# Patient Record
Sex: MALE | Race: WHITE | NOT HISPANIC OR LATINO
[De-identification: names, ages, dates, MRNs, and addresses within clinical notes are randomized per-mention and may not be internally consistent; named-entity substitution may affect disease eponyms.]

---

## 2019-01-08 ENCOUNTER — NON-APPOINTMENT (OUTPATIENT)
Age: 69
End: 2019-01-08

## 2019-01-08 ENCOUNTER — APPOINTMENT (OUTPATIENT)
Dept: CARDIOLOGY | Facility: CLINIC | Age: 69
End: 2019-01-08
Payer: COMMERCIAL

## 2019-01-08 VITALS
HEIGHT: 69 IN | BODY MASS INDEX: 34.21 KG/M2 | HEART RATE: 77 BPM | DIASTOLIC BLOOD PRESSURE: 85 MMHG | SYSTOLIC BLOOD PRESSURE: 154 MMHG | OXYGEN SATURATION: 94 % | WEIGHT: 231 LBS

## 2019-01-08 DIAGNOSIS — R07.89 OTHER CHEST PAIN: ICD-10-CM

## 2019-01-08 DIAGNOSIS — G47.33 OBSTRUCTIVE SLEEP APNEA (ADULT) (PEDIATRIC): ICD-10-CM

## 2019-01-08 DIAGNOSIS — I25.84 ATHEROSCLEROTIC HEART DISEASE OF NATIVE CORONARY ARTERY W/OUT ANGINA PECTORIS: ICD-10-CM

## 2019-01-08 DIAGNOSIS — R06.89 OTHER ABNORMALITIES OF BREATHING: ICD-10-CM

## 2019-01-08 DIAGNOSIS — I25.10 ATHEROSCLEROTIC HEART DISEASE OF NATIVE CORONARY ARTERY W/OUT ANGINA PECTORIS: ICD-10-CM

## 2019-01-08 PROCEDURE — 99215 OFFICE O/P EST HI 40 MIN: CPT

## 2019-01-08 PROCEDURE — 93000 ELECTROCARDIOGRAM COMPLETE: CPT

## 2019-01-08 NOTE — PHYSICAL EXAM
[General Appearance - Well Developed] : well developed [Normal Appearance] : normal appearance [Well Groomed] : well groomed [General Appearance - Well Nourished] : well nourished [No Deformities] : no deformities [General Appearance - In No Acute Distress] : no acute distress [Normal Conjunctiva] : the conjunctiva exhibited no abnormalities [Normal Oral Mucosa] : normal oral mucosa [Normal Jugular Venous A Waves Present] : normal jugular venous A waves present [Normal Jugular Venous V Waves Present] : normal jugular venous V waves present [No Jugular Venous Ross A Waves] : no jugular venous ross A waves [Respiration, Rhythm And Depth] : normal respiratory rhythm and effort [Exaggerated Use Of Accessory Muscles For Inspiration] : no accessory muscle use [Auscultation Breath Sounds / Voice Sounds] : lungs were clear to auscultation bilaterally [Bowel Sounds] : normal bowel sounds [Abdomen Soft] : soft [Abdomen Tenderness] : non-tender [Abnormal Walk] : normal gait [Gait - Sufficient For Exercise Testing] : the gait was sufficient for exercise testing [Nail Clubbing] : no clubbing of the fingernails [Cyanosis, Localized] : no localized cyanosis [Skin Color & Pigmentation] : normal skin color and pigmentation [Skin Turgor] : normal skin turgor [] : no rash [Oriented To Time, Place, And Person] : oriented to person, place, and time [Impaired Insight] : insight and judgment were intact [No Anxiety] : not feeling anxious [Normal Rate] : normal [Normal S1] : normal S1 [Normal S2] : normal S2 [No Murmur] : no murmurs heard [2+] : left 2+ [No Abnormalities] : the abdominal aorta was not enlarged and no bruit was heard [No Pitting Edema] : no pitting edema present [S3] : no S3 [S4] : no S4 [Right Carotid Bruit] : no bruit heard over the right carotid [Left Carotid Bruit] : no bruit heard over the left carotid [Right Femoral Bruit] : no bruit heard over the right femoral artery [Left Femoral Bruit] : no bruit heard over the left femoral artery

## 2019-01-08 NOTE — DISCUSSION/SUMMARY
[FreeTextEntry1] : This is a 68-year-old white male who has coronary calcification which places him at least with mild plaque. He had a normal nuclear stress test in 2016 total heart workload of 13 Mets. He has borderline hypertension and does have elevation of his LDL cholesterol based on her blood tests from 2015.\par \par In view of his elevated calcium score, I would like to do a cardiac CT angiogram to quantitate his disease and order to decide if we need to treat him aggressively.\par \par Pending the results of the CT scan we may do a stress test. He'll go for general blood work today to include a fasting lipid profile, as well as general blood work.\par \par This was discussed with the patient and I answered his questions. Has had chest discomfort in the past which proved not to be cardiac.

## 2019-01-08 NOTE — REVIEW OF SYSTEMS
[Recent Weight Gain (___ Lbs)] : recent [unfilled] ~Ulb weight gain [Loss Of Hearing] : hearing loss [Negative] : Gastrointestinal [Fever] : no fever [Headache] : no headache [Chills] : no chills [Feeling Fatigued] : not feeling fatigued [Blurry Vision] : no blurred vision [Seeing Double (Diplopia)] : no diplopia [Skin: A Rash] : no rash: [Dizziness] : no dizziness [Depression] : no depression [Anxiety] : no anxiety [Excessive Thirst] : no polydipsia [Easy Bleeding] : no tendency for easy bleeding

## 2019-01-08 NOTE — HISTORY OF PRESENT ILLNESS
[FreeTextEntry1] : I saw Jason Glynn in the office today for a followup visit. He is a 68-year-old white male who enjoys good general health. Does have obstructive sleep apnea and uses a mask. He does exercise in the gym twice a week. He has been losing weight and has improved his diet.\par A resting 12-lead electrocardiogram performed today demonstrates regular sinus rhythm and appears within normal limits.\par \par I last saw him 11/15 was complaining of some chest pain when he first started exercising it would go away with additional exercise. He had a nuclear stress test performed 1/16 that showed no ischemia at workload of 13 Mets. EF was 61%\par \par 9/15 his blood work was remarkable for a cholesterol of 199, triglycerides 144, HDL 47, and . CRP was 4.4. Glucose 94. . Carotid Doppler 7/13 showed no plaque. He  had a cardiac calcium score10/15, of 275. Left main 26, , circumflex 42, and RCA 91.\par \par He does note some mild shortness of breath and some slight heaviness in his chest when he does physical exertion. This is not be getting any worse. He has been trying to watch his diet carefully.\par \par A resting 12-lead electrocardiogram demonstrates sinus rhythm. There is nonspecific T-wave flattening.

## 2019-01-09 LAB
ALBUMIN SERPL ELPH-MCNC: 4.3 G/DL
ALP BLD-CCNC: 69 U/L
ALT SERPL-CCNC: 26 U/L
ANION GAP SERPL CALC-SCNC: 12 MMOL/L
AST SERPL-CCNC: 12 U/L
BASOPHILS # BLD AUTO: 0.03 K/UL
BASOPHILS NFR BLD AUTO: 0.5 %
BILIRUB SERPL-MCNC: 1 MG/DL
BUN SERPL-MCNC: 21 MG/DL
CALCIUM SERPL-MCNC: 9.1 MG/DL
CHLORIDE SERPL-SCNC: 106 MMOL/L
CHOLEST SERPL-MCNC: 171 MG/DL
CHOLEST/HDLC SERPL: 4 RATIO
CO2 SERPL-SCNC: 26 MMOL/L
CREAT SERPL-MCNC: 1.01 MG/DL
CRP SERPL-MCNC: 0.51 MG/DL
EOSINOPHIL # BLD AUTO: 0.09 K/UL
EOSINOPHIL NFR BLD AUTO: 1.5 %
GLUCOSE SERPL-MCNC: 104 MG/DL
HCT VFR BLD CALC: 47.4 %
HDLC SERPL-MCNC: 43 MG/DL
HGB BLD-MCNC: 15.1 G/DL
IMM GRANULOCYTES NFR BLD AUTO: 0.2 %
LDLC SERPL CALC-MCNC: 114 MG/DL
LYMPHOCYTES # BLD AUTO: 1.45 K/UL
LYMPHOCYTES NFR BLD AUTO: 23.7 %
MAN DIFF?: NORMAL
MCHC RBC-ENTMCNC: 30.6 PG
MCHC RBC-ENTMCNC: 31.9 GM/DL
MCV RBC AUTO: 96 FL
MONOCYTES # BLD AUTO: 0.49 K/UL
MONOCYTES NFR BLD AUTO: 8 %
NEUTROPHILS # BLD AUTO: 4.06 K/UL
NEUTROPHILS NFR BLD AUTO: 66.1 %
PLATELET # BLD AUTO: 222 K/UL
POTASSIUM SERPL-SCNC: 4.4 MMOL/L
PROT SERPL-MCNC: 6.7 G/DL
PSA SERPL-MCNC: 2.07 NG/ML
RBC # BLD: 4.94 M/UL
RBC # FLD: 13.2 %
SODIUM SERPL-SCNC: 144 MMOL/L
TRIGL SERPL-MCNC: 71 MG/DL
WBC # FLD AUTO: 6.13 K/UL

## 2019-01-24 ENCOUNTER — OUTPATIENT (OUTPATIENT)
Dept: OUTPATIENT SERVICES | Facility: HOSPITAL | Age: 69
LOS: 1 days | End: 2019-01-24
Payer: COMMERCIAL

## 2019-01-24 VITALS
TEMPERATURE: 98 F | WEIGHT: 220.02 LBS | SYSTOLIC BLOOD PRESSURE: 137 MMHG | HEART RATE: 58 BPM | HEIGHT: 69 IN | OXYGEN SATURATION: 96 % | DIASTOLIC BLOOD PRESSURE: 81 MMHG

## 2019-01-24 DIAGNOSIS — R07.89 OTHER CHEST PAIN: ICD-10-CM

## 2019-01-24 DIAGNOSIS — Z98.52 VASECTOMY STATUS: Chronic | ICD-10-CM

## 2019-01-24 DIAGNOSIS — Z98.890 OTHER SPECIFIED POSTPROCEDURAL STATES: Chronic | ICD-10-CM

## 2019-01-24 LAB
ANION GAP SERPL CALC-SCNC: 10 MMOL/L — SIGNIFICANT CHANGE UP (ref 5–17)
BUN SERPL-MCNC: 23 MG/DL — SIGNIFICANT CHANGE UP (ref 7–23)
CALCIUM SERPL-MCNC: 9.2 MG/DL — SIGNIFICANT CHANGE UP (ref 8.4–10.5)
CHLORIDE SERPL-SCNC: 108 MMOL/L — SIGNIFICANT CHANGE UP (ref 96–108)
CO2 SERPL-SCNC: 24 MMOL/L — SIGNIFICANT CHANGE UP (ref 22–31)
CREAT SERPL-MCNC: 1.04 MG/DL — SIGNIFICANT CHANGE UP (ref 0.5–1.3)
GLUCOSE SERPL-MCNC: 96 MG/DL — SIGNIFICANT CHANGE UP (ref 70–99)
HCT VFR BLD CALC: 46.7 % — SIGNIFICANT CHANGE UP (ref 39–50)
HGB BLD-MCNC: 15.4 G/DL — SIGNIFICANT CHANGE UP (ref 13–17)
MCHC RBC-ENTMCNC: 30 PG — SIGNIFICANT CHANGE UP (ref 27–34)
MCHC RBC-ENTMCNC: 33.1 GM/DL — SIGNIFICANT CHANGE UP (ref 32–36)
MCV RBC AUTO: 90.5 FL — SIGNIFICANT CHANGE UP (ref 80–100)
PLATELET # BLD AUTO: 240 K/UL — SIGNIFICANT CHANGE UP (ref 150–400)
POTASSIUM SERPL-MCNC: 4.3 MMOL/L — SIGNIFICANT CHANGE UP (ref 3.5–5.3)
POTASSIUM SERPL-SCNC: 4.3 MMOL/L — SIGNIFICANT CHANGE UP (ref 3.5–5.3)
RBC # BLD: 5.16 M/UL — SIGNIFICANT CHANGE UP (ref 4.2–5.8)
RBC # FLD: 11.7 % — SIGNIFICANT CHANGE UP (ref 10.3–14.5)
SODIUM SERPL-SCNC: 142 MMOL/L — SIGNIFICANT CHANGE UP (ref 135–145)
WBC # BLD: 7.2 K/UL — SIGNIFICANT CHANGE UP (ref 3.8–10.5)
WBC # FLD AUTO: 7.2 K/UL — SIGNIFICANT CHANGE UP (ref 3.8–10.5)

## 2019-01-24 PROCEDURE — 85027 COMPLETE CBC AUTOMATED: CPT

## 2019-01-24 PROCEDURE — 93458 L HRT ARTERY/VENTRICLE ANGIO: CPT

## 2019-01-24 PROCEDURE — 99152 MOD SED SAME PHYS/QHP 5/>YRS: CPT | Mod: GC

## 2019-01-24 PROCEDURE — 93010 ELECTROCARDIOGRAM REPORT: CPT | Mod: 59

## 2019-01-24 PROCEDURE — 99152 MOD SED SAME PHYS/QHP 5/>YRS: CPT

## 2019-01-24 PROCEDURE — C1887: CPT

## 2019-01-24 PROCEDURE — 99203 OFFICE O/P NEW LOW 30 MIN: CPT

## 2019-01-24 PROCEDURE — 80048 BASIC METABOLIC PNL TOTAL CA: CPT

## 2019-01-24 PROCEDURE — C1894: CPT

## 2019-01-24 PROCEDURE — 93005 ELECTROCARDIOGRAM TRACING: CPT

## 2019-01-24 PROCEDURE — C1769: CPT

## 2019-01-24 PROCEDURE — 93458 L HRT ARTERY/VENTRICLE ANGIO: CPT | Mod: 26,GC

## 2019-01-24 NOTE — H&P CARDIOLOGY - HISTORY OF PRESENT ILLNESS
70 y/o M with GABY on CPAP, hx of kidney stone s/p stent several years ago presents for evaluated for fatigue and shortness of breath with activity. Pt had normal stress 2016.     Dr. Cruz 70 y/o M with GABY on CPAP, hx of kidney stone s/p stent several years ago presents for evaluated for fatigue and shortness of breath with activity. Pt had normal stress 2016.     Dr. Cruz cardiologist

## 2019-05-21 ENCOUNTER — APPOINTMENT (OUTPATIENT)
Dept: UROLOGY | Facility: CLINIC | Age: 69
End: 2019-05-21
Payer: MEDICARE

## 2019-05-21 VITALS
DIASTOLIC BLOOD PRESSURE: 65 MMHG | BODY MASS INDEX: 32.14 KG/M2 | HEIGHT: 69 IN | HEART RATE: 60 BPM | SYSTOLIC BLOOD PRESSURE: 143 MMHG | WEIGHT: 217 LBS | TEMPERATURE: 98.2 F | RESPIRATION RATE: 17 BRPM

## 2019-05-21 DIAGNOSIS — Z87.442 PERSONAL HISTORY OF URINARY CALCULI: ICD-10-CM

## 2019-05-21 DIAGNOSIS — Z87.891 PERSONAL HISTORY OF NICOTINE DEPENDENCE: ICD-10-CM

## 2019-05-21 PROCEDURE — 99203 OFFICE O/P NEW LOW 30 MIN: CPT

## 2019-05-21 NOTE — ASSESSMENT
[FreeTextEntry1] : 70 y/o M w/ L renal mass\par - discussed natural history of small renal masses, management options discussed including surveillance, ablation, extirpation, and biopsy\par - patient elected to have the mass removed, R/B/A to laparoscopic partial nephrectomy discussed\par - CXR to complete staging\par - cardiac clearance\par - book for OR

## 2019-05-21 NOTE — PHYSICAL EXAM
[General Appearance - Well Developed] : well developed [General Appearance - Well Nourished] : well nourished [Edema] : no peripheral edema [] : no respiratory distress [Exaggerated Use Of Accessory Muscles For Inspiration] : no accessory muscle use [Normal Station and Gait] : the gait and station were normal for the patient's age [No Focal Deficits] : no focal deficits [FreeTextEntry1] : no suprapubic tenderness, no flank tenderness, no CVAT

## 2019-05-21 NOTE — HISTORY OF PRESENT ILLNESS
[FreeTextEntry1] : 70 y/o M referred for L left renal mass. pt has a urologist following him for PSA and NAYE. After c/o back pain, a CT scan performed at [facility] demonstrated a L renal mass 1.7cm anterolateral, >50% exophytic. Pt is asymptomatic from a urologic point of view, denies hematuria, dysuria, urinary frequency, urinary urgency, weak urinary stream, feelings of retention, flank pain, suprapubic pain.\par \par He is a [facility] 911 . Former smoker 1/2pk per year x 1 year. Denies FHx of kidney cancer, his father had prostate cancer which was not treated b/c he was Dx at 79y/o\par \par Denies F/C, N/V, CP, SOB, abd pain.

## 2019-05-21 NOTE — HISTORY OF PRESENT ILLNESS
[FreeTextEntry1] : 70 y/o M referred for L left renal mass. pt has a urologist following him for PSA and NAYE. After c/o back pain, a CT scan performed at [facility] demonstrated a L renal mass 1.7cm anterolateral, >50% exophytic. Pt is asymptomatic from a urologic point of view, denies hematuria, dysuria, urinary frequency, urinary urgency, weak urinary stream, feelings of retention, flank pain, suprapubic pain.\par \par He is a [facility] 911 . Former smoker 1/2pk per year x 1 year. Denies FHx of kidney cancer, his father had prostate cancer which was not treated b/c he was Dx at 79y/o\par \par Denies F/C, N/V, CP, SOB, abd pain.

## 2019-05-30 ENCOUNTER — OUTPATIENT (OUTPATIENT)
Dept: OUTPATIENT SERVICES | Facility: HOSPITAL | Age: 69
LOS: 1 days | End: 2019-05-30

## 2019-05-30 VITALS
HEIGHT: 68.25 IN | WEIGHT: 223.11 LBS | DIASTOLIC BLOOD PRESSURE: 78 MMHG | HEART RATE: 55 BPM | TEMPERATURE: 98 F | SYSTOLIC BLOOD PRESSURE: 140 MMHG | RESPIRATION RATE: 16 BRPM

## 2019-05-30 DIAGNOSIS — Z98.52 VASECTOMY STATUS: Chronic | ICD-10-CM

## 2019-05-30 DIAGNOSIS — N28.1 CYST OF KIDNEY, ACQUIRED: ICD-10-CM

## 2019-05-30 DIAGNOSIS — N28.89 OTHER SPECIFIED DISORDERS OF KIDNEY AND URETER: ICD-10-CM

## 2019-05-30 DIAGNOSIS — Z98.890 OTHER SPECIFIED POSTPROCEDURAL STATES: Chronic | ICD-10-CM

## 2019-05-30 LAB
ANION GAP SERPL CALC-SCNC: 15 MMO/L — HIGH (ref 7–14)
APPEARANCE UR: CLEAR — SIGNIFICANT CHANGE UP
BILIRUB UR-MCNC: NEGATIVE — SIGNIFICANT CHANGE UP
BLD GP AB SCN SERPL QL: NEGATIVE — SIGNIFICANT CHANGE UP
BLOOD UR QL VISUAL: NEGATIVE — SIGNIFICANT CHANGE UP
BUN SERPL-MCNC: 20 MG/DL — SIGNIFICANT CHANGE UP (ref 7–23)
CALCIUM SERPL-MCNC: 9.2 MG/DL — SIGNIFICANT CHANGE UP (ref 8.4–10.5)
CHLORIDE SERPL-SCNC: 104 MMOL/L — SIGNIFICANT CHANGE UP (ref 98–107)
CO2 SERPL-SCNC: 23 MMOL/L — SIGNIFICANT CHANGE UP (ref 22–31)
COLOR SPEC: YELLOW — SIGNIFICANT CHANGE UP
CREAT SERPL-MCNC: 0.95 MG/DL — SIGNIFICANT CHANGE UP (ref 0.5–1.3)
GLUCOSE SERPL-MCNC: 97 MG/DL — SIGNIFICANT CHANGE UP (ref 70–99)
GLUCOSE UR-MCNC: 500 — HIGH
HCT VFR BLD CALC: 46.7 % — SIGNIFICANT CHANGE UP (ref 39–50)
HGB BLD-MCNC: 15 G/DL — SIGNIFICANT CHANGE UP (ref 13–17)
KETONES UR-MCNC: NEGATIVE — SIGNIFICANT CHANGE UP
LEUKOCYTE ESTERASE UR-ACNC: NEGATIVE — SIGNIFICANT CHANGE UP
MCHC RBC-ENTMCNC: 30.3 PG — SIGNIFICANT CHANGE UP (ref 27–34)
MCHC RBC-ENTMCNC: 32.1 % — SIGNIFICANT CHANGE UP (ref 32–36)
MCV RBC AUTO: 94.3 FL — SIGNIFICANT CHANGE UP (ref 80–100)
NITRITE UR-MCNC: NEGATIVE — SIGNIFICANT CHANGE UP
NRBC # FLD: 0 K/UL — SIGNIFICANT CHANGE UP (ref 0–0)
PH UR: 5.5 — SIGNIFICANT CHANGE UP (ref 5–8)
PLATELET # BLD AUTO: 206 K/UL — SIGNIFICANT CHANGE UP (ref 150–400)
PMV BLD: 9.8 FL — SIGNIFICANT CHANGE UP (ref 7–13)
POTASSIUM SERPL-MCNC: 3.8 MMOL/L — SIGNIFICANT CHANGE UP (ref 3.5–5.3)
POTASSIUM SERPL-SCNC: 3.8 MMOL/L — SIGNIFICANT CHANGE UP (ref 3.5–5.3)
PROT UR-MCNC: NEGATIVE — SIGNIFICANT CHANGE UP
RBC # BLD: 4.95 M/UL — SIGNIFICANT CHANGE UP (ref 4.2–5.8)
RBC # FLD: 12.8 % — SIGNIFICANT CHANGE UP (ref 10.3–14.5)
RH IG SCN BLD-IMP: NEGATIVE — SIGNIFICANT CHANGE UP
SODIUM SERPL-SCNC: 142 MMOL/L — SIGNIFICANT CHANGE UP (ref 135–145)
SP GR SPEC: 1.02 — SIGNIFICANT CHANGE UP (ref 1–1.04)
UROBILINOGEN FLD QL: NORMAL — SIGNIFICANT CHANGE UP
WBC # BLD: 6.21 K/UL — SIGNIFICANT CHANGE UP (ref 3.8–10.5)
WBC # FLD AUTO: 6.21 K/UL — SIGNIFICANT CHANGE UP (ref 3.8–10.5)

## 2019-05-30 RX ORDER — ASPIRIN/CALCIUM CARB/MAGNESIUM 324 MG
1 TABLET ORAL
Qty: 0 | Refills: 0 | DISCHARGE

## 2019-05-30 RX ORDER — SODIUM CHLORIDE 9 MG/ML
1000 INJECTION, SOLUTION INTRAVENOUS
Refills: 0 | Status: DISCONTINUED | OUTPATIENT
Start: 2019-06-10 | End: 2019-06-11

## 2019-05-30 RX ORDER — PANTOPRAZOLE SODIUM 20 MG/1
1 TABLET, DELAYED RELEASE ORAL
Qty: 0 | Refills: 0 | DISCHARGE

## 2019-05-30 NOTE — H&P PST ADULT - NSICDXPASTMEDICALHX_GEN_ALL_CORE_FT
PAST MEDICAL HISTORY:  Kidney stones     Obstructive sleep apnea, adult PAST MEDICAL HISTORY:  Cyst of left kidney     Kidney stones     Obstructive sleep apnea, adult

## 2019-05-30 NOTE — H&P PST ADULT - NSICDXPROBLEM_GEN_ALL_CORE_FT
PROBLEM DIAGNOSES  Problem: Kidney cysts  Assessment and Plan: Pt scheduled for surgery and given preop instructions including for Chlorhexidine and verbalized understanding.   OR Booking notified of Sleep Apnea and hearing loss   Request Angiogram report and CC for patient   Pt to take own GI protection

## 2019-05-31 LAB
BACTERIA UR CULT: SIGNIFICANT CHANGE UP
SPECIMEN SOURCE: SIGNIFICANT CHANGE UP

## 2019-06-05 ENCOUNTER — APPOINTMENT (OUTPATIENT)
Dept: INTERVENTIONAL RADIOLOGY/VASCULAR | Facility: CLINIC | Age: 69
End: 2019-06-05

## 2019-06-09 ENCOUNTER — TRANSCRIPTION ENCOUNTER (OUTPATIENT)
Age: 69
End: 2019-06-09

## 2019-06-10 ENCOUNTER — RESULT REVIEW (OUTPATIENT)
Age: 69
End: 2019-06-10

## 2019-06-10 ENCOUNTER — APPOINTMENT (OUTPATIENT)
Dept: UROLOGY | Facility: HOSPITAL | Age: 69
End: 2019-06-10

## 2019-06-10 ENCOUNTER — INPATIENT (INPATIENT)
Facility: HOSPITAL | Age: 69
LOS: 0 days | Discharge: HOME | End: 2019-06-11
Attending: UROLOGY | Admitting: UROLOGY
Payer: MEDICARE

## 2019-06-10 VITALS
OXYGEN SATURATION: 98 % | WEIGHT: 214.95 LBS | TEMPERATURE: 98 F | HEART RATE: 52 BPM | RESPIRATION RATE: 16 BRPM | SYSTOLIC BLOOD PRESSURE: 152 MMHG | DIASTOLIC BLOOD PRESSURE: 79 MMHG | HEIGHT: 69 IN

## 2019-06-10 DIAGNOSIS — N28.89 OTHER SPECIFIED DISORDERS OF KIDNEY AND URETER: ICD-10-CM

## 2019-06-10 DIAGNOSIS — Z98.890 OTHER SPECIFIED POSTPROCEDURAL STATES: Chronic | ICD-10-CM

## 2019-06-10 DIAGNOSIS — S02.5XXA FRACTURE OF TOOTH (TRAUMATIC), INITIAL ENCOUNTER FOR CLOSED FRACTURE: ICD-10-CM

## 2019-06-10 DIAGNOSIS — Z98.52 VASECTOMY STATUS: Chronic | ICD-10-CM

## 2019-06-10 DIAGNOSIS — K21.9 GASTRO-ESOPHAGEAL REFLUX DISEASE WITHOUT ESOPHAGITIS: ICD-10-CM

## 2019-06-10 DIAGNOSIS — G47.33 OBSTRUCTIVE SLEEP APNEA (ADULT) (PEDIATRIC): ICD-10-CM

## 2019-06-10 LAB — RH IG SCN BLD-IMP: NEGATIVE — SIGNIFICANT CHANGE UP

## 2019-06-10 PROCEDURE — 88305 TISSUE EXAM BY PATHOLOGIST: CPT | Mod: 26

## 2019-06-10 PROCEDURE — 88312 SPECIAL STAINS GROUP 1: CPT | Mod: 26

## 2019-06-10 PROCEDURE — 76998 US GUIDE INTRAOP: CPT | Mod: 26

## 2019-06-10 PROCEDURE — 88331 PATH CONSLTJ SURG 1 BLK 1SPC: CPT | Mod: 26

## 2019-06-10 PROCEDURE — 99223 1ST HOSP IP/OBS HIGH 75: CPT

## 2019-06-10 PROCEDURE — 71045 X-RAY EXAM CHEST 1 VIEW: CPT | Mod: 26

## 2019-06-10 PROCEDURE — 88307 TISSUE EXAM BY PATHOLOGIST: CPT | Mod: 26

## 2019-06-10 PROCEDURE — 50543 LAPARO PARTIAL NEPHRECTOMY: CPT | Mod: LT

## 2019-06-10 RX ORDER — ONDANSETRON 8 MG/1
4 TABLET, FILM COATED ORAL ONCE
Refills: 0 | Status: COMPLETED | OUTPATIENT
Start: 2019-06-10 | End: 2019-06-10

## 2019-06-10 RX ORDER — HYDROMORPHONE HYDROCHLORIDE 2 MG/ML
0.5 INJECTION INTRAMUSCULAR; INTRAVENOUS; SUBCUTANEOUS
Refills: 0 | Status: DISCONTINUED | OUTPATIENT
Start: 2019-06-10 | End: 2019-06-11

## 2019-06-10 RX ORDER — ONDANSETRON 8 MG/1
4 TABLET, FILM COATED ORAL EVERY 6 HOURS
Refills: 0 | Status: DISCONTINUED | OUTPATIENT
Start: 2019-06-10 | End: 2019-06-11

## 2019-06-10 RX ORDER — KETOROLAC TROMETHAMINE 30 MG/ML
15 SYRINGE (ML) INJECTION EVERY 6 HOURS
Refills: 0 | Status: DISCONTINUED | OUTPATIENT
Start: 2019-06-10 | End: 2019-06-11

## 2019-06-10 RX ORDER — HEPARIN SODIUM 5000 [USP'U]/ML
5000 INJECTION INTRAVENOUS; SUBCUTANEOUS EVERY 8 HOURS
Refills: 0 | Status: DISCONTINUED | OUTPATIENT
Start: 2019-06-10 | End: 2019-06-11

## 2019-06-10 RX ORDER — ACETAMINOPHEN 500 MG
1000 TABLET ORAL ONCE
Refills: 0 | Status: COMPLETED | OUTPATIENT
Start: 2019-06-10 | End: 2019-06-10

## 2019-06-10 RX ORDER — METOCLOPRAMIDE HCL 10 MG
10 TABLET ORAL EVERY 6 HOURS
Refills: 0 | Status: DISCONTINUED | OUTPATIENT
Start: 2019-06-10 | End: 2019-06-11

## 2019-06-10 RX ORDER — PANTOPRAZOLE SODIUM 20 MG/1
40 TABLET, DELAYED RELEASE ORAL
Refills: 0 | Status: DISCONTINUED | OUTPATIENT
Start: 2019-06-10 | End: 2019-06-11

## 2019-06-10 RX ORDER — ACETAMINOPHEN 500 MG
1000 TABLET ORAL ONCE
Refills: 0 | Status: COMPLETED | OUTPATIENT
Start: 2019-06-11 | End: 2019-06-11

## 2019-06-10 RX ORDER — SENNA PLUS 8.6 MG/1
2 TABLET ORAL AT BEDTIME
Refills: 0 | Status: DISCONTINUED | OUTPATIENT
Start: 2019-06-10 | End: 2019-06-11

## 2019-06-10 RX ORDER — DOCUSATE SODIUM 100 MG
100 CAPSULE ORAL THREE TIMES A DAY
Refills: 0 | Status: DISCONTINUED | OUTPATIENT
Start: 2019-06-10 | End: 2019-06-11

## 2019-06-10 RX ADMIN — Medication 10 MILLIGRAM(S): at 17:40

## 2019-06-10 RX ADMIN — ONDANSETRON 4 MILLIGRAM(S): 8 TABLET, FILM COATED ORAL at 16:34

## 2019-06-10 RX ADMIN — Medication 100 MILLIGRAM(S): at 22:18

## 2019-06-10 RX ADMIN — HEPARIN SODIUM 5000 UNIT(S): 5000 INJECTION INTRAVENOUS; SUBCUTANEOUS at 22:18

## 2019-06-10 RX ADMIN — Medication 1000 MILLIGRAM(S): at 10:30

## 2019-06-10 RX ADMIN — HEPARIN SODIUM 5000 UNIT(S): 5000 INJECTION INTRAVENOUS; SUBCUTANEOUS at 16:23

## 2019-06-10 RX ADMIN — ONDANSETRON 4 MILLIGRAM(S): 8 TABLET, FILM COATED ORAL at 10:15

## 2019-06-10 RX ADMIN — Medication 400 MILLIGRAM(S): at 10:15

## 2019-06-10 RX ADMIN — SODIUM CHLORIDE 125 MILLILITER(S): 9 INJECTION, SOLUTION INTRAVENOUS at 10:15

## 2019-06-10 RX ADMIN — Medication 400 MILLIGRAM(S): at 22:18

## 2019-06-10 RX ADMIN — Medication 15 MILLIGRAM(S): at 22:18

## 2019-06-10 RX ADMIN — Medication 400 MILLIGRAM(S): at 16:23

## 2019-06-10 NOTE — CONSULT NOTE ADULT - ASSESSMENT
69M GABY, [place], kidney stones, GERD, Mathews's esophagus, cath January 2019 mild diffuse CAD/nonobstructive CAD, back pain, pt had noted left kidney cyst (on CT scan with w/u for lower back pain) concerning for malignancy.  Now s/p L lap partial nephrectomy.

## 2019-06-10 NOTE — CONSULT NOTE ADULT - PROBLEM/RECOMMENDATION-1
DISPLAY PLAN FREE TEXT
Principal Discharge DX:	Acute on chronic systolic congestive heart failure  Goal:	symptom management  Assessment and plan of treatment:	Weigh yourself daily.  If you gain 3lbs in 3 days, or 5lbs in a week call your Health Care Provider.  Do not eat or drink foods containing more than 2000mg of salt (sodium) in your diet every day.  Call your Health Care Provider if you have any swelling or increased swelling in your feet, ankles, and/or stomach.  Take all of your medication as directed.  If you become dizzy call your Health Care Provider.  Secondary Diagnosis:	Emphysema/COPD  Goal:	symptom management  Assessment and plan of treatment:	Call your Health Care provider upon arrival home to make a follow up appointment within one week.  Take all inhalers as prescribed by your Health Care Provider.  Take steroids as prescribed by your Health Care Provider.  If your cough increases infrequency and severity and/or you have shortness of breath or increased shortness of breath call your Health Care Provider.  If you develop fever, chills, night sweats, malaise, and/or change in mental status call your Health care Provider.  Nutrition is very important.  Eat small frequent meals.  Increase your activity as tolerated.  Do not stay in bed all day  Secondary Diagnosis:	Anginal pain  Goal:	resolved  Assessment and plan of treatment:	take medications as prescribed - follow up with Dr Lynch on Monday, July 30  Secondary Diagnosis:	CAD (coronary artery disease)  Goal:	risk modification  Assessment and plan of treatment:	Coronary artery disease is a condition where the arteries the supply the heart muscle get clogges with fatty deposits & puts you at risk for a heart attack  Call your doctor if you have any new pain, pressure, or discomfort in the center of your chest, pain, tingling or discomfort in arms, back, neck, jaw, or stomach, shortness of breath, nausea, vomiting, burping or heartburn, sweating, cold and clammy skin, racing or abnormal heartbeat for more than 10 minutes or if they keep coming & going.  Call 911 and do not tr to get to hospital by care  You can help yourself with lefestyle changes (quitting smoking if you smoke), eat lots of fruits & vegetables & low fat dairy products, not a lot of meat & fatty foods, walk or some form of physical activity most days of the week, lose weight if you are overweight  Take your cardiac medication as prescribed to lower cholesterol, to lower blood pressure, aspirin to prevent blood clots, and diabetes control  Make sure to keep appointments with doctor for cardiac follow up care  Secondary Diagnosis:	SOB (shortness of breath)  Goal:	resolved  Assessment and plan of treatment:	take medications as prescribed - follow up with Dr Lynch on Monday, July 30

## 2019-06-10 NOTE — CONSULT NOTE ADULT - SUBJECTIVE AND OBJECTIVE BOX
Patient is a 69y old  Male who presents with a chief complaint of Left kidney cyst (30 May 2019 08:37)    HPI: 69M GABY, [place], kidney stones, GERD, Mathews's esophagus, cath January 2019 mild diffuse CAD/nonobstructive CAD, back pain, pt had noted left kidney cyst (on CT scan with w/u for lower back pain) concerning for malignancy.  Denied hematuria or dysuria.  Now s/p L lap partial nephrectomy.  +nausea/vomiting after anesthesia, better after Reglan.  No chest pain, SOB.    Allergies  No Known Allergies    HOME MEDICATIONS: [ X] Reviewed  Protonix    MEDICATIONS  (STANDING):  acetaminophen  IVPB .. 1000 milliGRAM(s) IV Intermittent once  docusate sodium 100 milliGRAM(s) Oral three times a day  heparin  Injectable 5000 Unit(s) SubCutaneous every 8 hours  ketorolac   Injectable 15 milliGRAM(s) IV Push every 6 hours  lactated ringers. 1000 milliLiter(s) (125 mL/Hr) IV Continuous <Continuous>  pantoprazole    Tablet 40 milliGRAM(s) Oral before breakfast    MEDICATIONS  (PRN):  HYDROmorphone  Injectable 0.5 milliGRAM(s) IV Push every 10 minutes PRN Severe Pain (7 - 10)  metoclopramide Injectable 10 milliGRAM(s) IV Push every 6 hours PRN nausea  ondansetron Injectable 4 milliGRAM(s) IV Push every 6 hours PRN Nausea and/or Vomiting  senna 2 Tablet(s) Oral at bedtime PRN Constipation    PAST MEDICAL & SURGICAL HISTORY:  Cyst of left kidney  Obstructive sleep apnea, adult  Kidney stones  S/P knee surgery: left  H/O vasectomy    SOCIAL HISTORY:  retired [employer], 2 children, no tobacco, rare alcohol    FAMILY HISTORY:  father - prostate cancer    REVIEW OF SYSTEMS:  CONSTITUTIONAL: No fever or fatigue; has lost weight since long term, been more active  EYES: No eye pain, visual disturbances, or discharge  ENMT:  No difficulty hearing, tinnitus, vertigo; No sinus or throat pain  NECK: No pain or stiffness  BREASTS: No pain, masses, or nipple discharge  RESPIRATORY: No cough, wheezing, chills or hemoptysis; No shortness of breath  CARDIOVASCULAR: No chest pain, palpitations, dizziness, or leg swelling  GASTROINTESTINAL: No abdominal or epigastric pain. No nausea, vomiting, or hematemesis; No diarrhea or constipation. No melena or hematochezia.  GENITOURINARY: No dysuria, frequency, hematuria, or incontinence  NEUROLOGICAL: No headaches, memory loss, loss of strength, numbness, or tremors  SKIN: No itching, burning, rashes, or lesions   LYMPH NODES: No enlarged glands  ENDOCRINE: No heat or cold intolerance; No hair loss  MUSCULOSKELETAL: chronic back pain  PSYCHIATRIC: No depression, anxiety, mood swings, or difficulty sleeping  HEME/LYMPH: No easy bruising, or bleeding gums  ALLERGY AND IMMUNOLOGIC: No hives or eczema  [ X ] All other ROS negative  [  ] Unable to obtain due to poor mental status    Vital Signs Last 24 Hrs  T(C): 36.4 (10 Natalio 2019 13:00), Max: 36.7 (10 Natalio 2019 06:00)  T(F): 97.5 (10 Natalio 2019 13:00), Max: 98.1 (10 Natalio 2019 06:00)  HR: 58 (10 Natalio 2019 15:02) (47 - 58)  BP: 140/63 (10 Natalio 2019 15:02) (129/77 - 155/73)  BP(mean): 79 (10 Natalio 2019 13:00) (74 - 94)  RR: 17 (10 Natalio 2019 15:02) (10 - 18)  SpO2: 100% (10 Natalio 2019 15:02) (93% - 100%)    PHYSICAL EXAM:  GENERAL: NAD, well-groomed, well-developed  HEAD:  Atraumatic, Normocephalic  EYES: EOMI, PERRLA, conjunctiva and sclera clear  ENMT: Moist mucous membranes;  chipped front tooth  NECK: Supple, No JVD  RESPIRATORY: Clear to auscultation bilaterally; No rales, rhonchi, wheezing, or rubs  CARDIOVASCULAR: Regular rate and rhythm; No murmurs, rubs, or gallops  GASTROINTESTINAL: Soft, Bowel sounds present, incisions intact  GENITOURINARY: Not examined  EXTREMITIES:  2+ Peripheral Pulses, No clubbing, cyanosis, or edema  NERVOUS SYSTEM:  Alert & Oriented X3; Moving all 4 extremities; No gross sensory deficits  HEME/LYMPH: No lymphadenopathy noted  SKIN: No rashes or lesions; Incisions C/D/I  : +ridley pink urine    LABS:    Basic Metabolic Panel (05.30.19 @ 09:20)    Sodium, Serum: 142 mmol/L    Potassium, Serum: 3.8 mmol/L    Chloride, Serum: 104 mmol/L    Carbon Dioxide, Serum: 23 mmol/L    Anion Gap, Serum: 15 mmo/L    Blood Urea Nitrogen, Serum: 20 mg/dL    Creatinine, Serum: 0.95 mg/dL    Glucose, Serum: 97 mg/dL    Calcium, Total Serum: 9.2 mg/dL    eGFR if Non : 82:     eGFR if : 95 mL/min      Complete Blood Count (05.30.19 @ 09:20)    WBC Count: 6.21 K/uL    RBC Count: 4.95 M/uL    Hemoglobin: 15.0 g/dL    Hematocrit: 46.7 %    Mean Cell Volume: 94.3 fL    Mean Cell Hemoglobin: 30.3 pg    Mean Cell Hemoglobin Conc: 32.1 %    Red Cell Distrib Width: 12.8 %    Platelet Count - Automated: 206 K/uL    MPV: 9.8 fl    Nucleated RBC #: 0 K/uL      RADIOLOGY & ADDITIONAL STUDIES:    EKG:   Personally Reviewed:  [ ] YES     Imaging:   Personally Reviewed:  [ ] YES               Consultant(s) notes reviewed:    Care Discussed with Consultant(s)/Other Providers: urology re overall care

## 2019-06-10 NOTE — PROGRESS NOTE ADULT - SUBJECTIVE AND OBJECTIVE BOX
Post op check    This 70 yo M is s/p L. lap partial neph  PMH: GABY    Pt is awake and alert  Has no c/o  Afeb 130/64 54 100%RA    Abd- soft; appropriately tender             wounds C&D  Wilburn clear 225

## 2019-06-10 NOTE — CONSULT NOTE ADULT - PROBLEM SELECTOR RECOMMENDATION 3
post op management per urology, early ambulation, await return of bowel function, pain control, IVFs, IS, DVT ppx (SC heparin)

## 2019-06-11 ENCOUNTER — TRANSCRIPTION ENCOUNTER (OUTPATIENT)
Age: 69
End: 2019-06-11

## 2019-06-11 VITALS
DIASTOLIC BLOOD PRESSURE: 66 MMHG | SYSTOLIC BLOOD PRESSURE: 138 MMHG | OXYGEN SATURATION: 99 % | RESPIRATION RATE: 17 BRPM | TEMPERATURE: 98 F | HEART RATE: 73 BPM

## 2019-06-11 LAB
ANION GAP SERPL CALC-SCNC: 11 MMO/L — SIGNIFICANT CHANGE UP (ref 7–14)
BUN SERPL-MCNC: 17 MG/DL — SIGNIFICANT CHANGE UP (ref 7–23)
CALCIUM SERPL-MCNC: 8.6 MG/DL — SIGNIFICANT CHANGE UP (ref 8.4–10.5)
CHLORIDE SERPL-SCNC: 102 MMOL/L — SIGNIFICANT CHANGE UP (ref 98–107)
CO2 SERPL-SCNC: 24 MMOL/L — SIGNIFICANT CHANGE UP (ref 22–31)
CREAT SERPL-MCNC: 1.15 MG/DL — SIGNIFICANT CHANGE UP (ref 0.5–1.3)
GLUCOSE SERPL-MCNC: 121 MG/DL — HIGH (ref 70–99)
HCT VFR BLD CALC: 39 % — SIGNIFICANT CHANGE UP (ref 39–50)
HGB BLD-MCNC: 13 G/DL — SIGNIFICANT CHANGE UP (ref 13–17)
MCHC RBC-ENTMCNC: 30.7 PG — SIGNIFICANT CHANGE UP (ref 27–34)
MCHC RBC-ENTMCNC: 33.3 % — SIGNIFICANT CHANGE UP (ref 32–36)
MCV RBC AUTO: 92.2 FL — SIGNIFICANT CHANGE UP (ref 80–100)
NRBC # FLD: 0 K/UL — SIGNIFICANT CHANGE UP (ref 0–0)
PLATELET # BLD AUTO: 223 K/UL — SIGNIFICANT CHANGE UP (ref 150–400)
PMV BLD: 9.7 FL — SIGNIFICANT CHANGE UP (ref 7–13)
POTASSIUM SERPL-MCNC: 4.2 MMOL/L — SIGNIFICANT CHANGE UP (ref 3.5–5.3)
POTASSIUM SERPL-SCNC: 4.2 MMOL/L — SIGNIFICANT CHANGE UP (ref 3.5–5.3)
RBC # BLD: 4.23 M/UL — SIGNIFICANT CHANGE UP (ref 4.2–5.8)
RBC # FLD: 12.5 % — SIGNIFICANT CHANGE UP (ref 10.3–14.5)
SODIUM SERPL-SCNC: 137 MMOL/L — SIGNIFICANT CHANGE UP (ref 135–145)
WBC # BLD: 13.7 K/UL — HIGH (ref 3.8–10.5)
WBC # FLD AUTO: 13.7 K/UL — HIGH (ref 3.8–10.5)

## 2019-06-11 PROCEDURE — 99232 SBSQ HOSP IP/OBS MODERATE 35: CPT

## 2019-06-11 RX ORDER — SODIUM CHLORIDE 9 MG/ML
1000 INJECTION, SOLUTION INTRAVENOUS
Refills: 0 | Status: DISCONTINUED | OUTPATIENT
Start: 2019-06-11 | End: 2019-06-11

## 2019-06-11 RX ADMIN — Medication 15 MILLIGRAM(S): at 03:36

## 2019-06-11 RX ADMIN — Medication 400 MILLIGRAM(S): at 03:36

## 2019-06-11 RX ADMIN — PANTOPRAZOLE SODIUM 40 MILLIGRAM(S): 20 TABLET, DELAYED RELEASE ORAL at 07:30

## 2019-06-11 RX ADMIN — HEPARIN SODIUM 5000 UNIT(S): 5000 INJECTION INTRAVENOUS; SUBCUTANEOUS at 05:38

## 2019-06-11 RX ADMIN — Medication 15 MILLIGRAM(S): at 10:09

## 2019-06-11 RX ADMIN — Medication 10 MILLIGRAM(S): at 07:30

## 2019-06-11 RX ADMIN — Medication 100 MILLIGRAM(S): at 05:38

## 2019-06-11 RX ADMIN — SODIUM CHLORIDE 125 MILLILITER(S): 9 INJECTION, SOLUTION INTRAVENOUS at 07:30

## 2019-06-11 NOTE — PROGRESS NOTE ADULT - SUBJECTIVE AND OBJECTIVE BOX
Patient is a 69y old  Male who presents with a chief complaint of surgery (10 Natalio 2019 18:36)      SUBJECTIVE / OVERNIGHT EVENTS:  Feeling well, eager to go home.  Wilburn out, has voided.  +flatus, no nausea/vomiting.  Pain controlled.  Up walking, no dizziness.      MEDICATIONS  (STANDING):  dextrose 5% + sodium chloride 0.45%. 1000 milliLiter(s) (75 mL/Hr) IV Continuous <Continuous>  docusate sodium 100 milliGRAM(s) Oral three times a day  heparin  Injectable 5000 Unit(s) SubCutaneous every 8 hours  ketorolac   Injectable 15 milliGRAM(s) IV Push every 6 hours  pantoprazole    Tablet 40 milliGRAM(s) Oral before breakfast    MEDICATIONS  (PRN):  HYDROmorphone  Injectable 0.5 milliGRAM(s) IV Push every 10 minutes PRN Severe Pain (7 - 10)  metoclopramide Injectable 10 milliGRAM(s) IV Push every 6 hours PRN nausea  ondansetron Injectable 4 milliGRAM(s) IV Push every 6 hours PRN Nausea and/or Vomiting  senna 2 Tablet(s) Oral at bedtime PRN Constipation      CAPILLARY BLOOD GLUCOSE          I&O's Summary    10 Natalio 2019 07:01  -  11 Jun 2019 07:00  --------------------------------------------------------  IN: 310 mL / OUT: 2675 mL / NET: -2365 mL    11 Jun 2019 07:01  -  11 Jun 2019 11:14  --------------------------------------------------------  IN: 0 mL / OUT: 575 mL / NET: -575 mL        Vital Signs Last 24 Hrs  T(C): 36.7 (11 Jun 2019 09:21), Max: 36.9 (11 Jun 2019 01:38)  T(F): 98 (11 Jun 2019 09:21), Max: 98.4 (11 Jun 2019 01:38)  HR: 73 (11 Jun 2019 09:21) (53 - 86)  BP: 138/66 (11 Jun 2019 09:21) (122/57 - 149/76)  BP(mean): 79 (10 Natalio 2019 13:00) (74 - 89)  RR: 17 (11 Jun 2019 09:21) (10 - 18)  SpO2: 99% (11 Jun 2019 09:21) (93% - 100%)    PHYSICAL EXAM:  GENERAL: NAD, well-developed, walking in rivers  HEAD:  Atraumatic, Normocephalic  EYES: EOMI, PERRLA, conjunctiva and sclera clear  NECK: Supple, No JVD  CHEST/LUNG: Clear to auscultation bilaterally; No wheeze  HEART: Regular rate and rhythm; No murmurs, rubs, or gallops  ABDOMEN: Soft, incisions intact  EXTREMITIES:  2+ Peripheral Pulses, No clubbing, cyanosis, or edema  PSYCH: AAOx3  NEUROLOGY: non-focal  SKIN: No rashes or lesions    LABS:                        13.0   13.70 )-----------( 223      ( 11 Jun 2019 05:21 )             39.0     06-11    137  |  102  |  17  ----------------------------<  121<H>  4.2   |  24  |  1.15    Ca    8.6      11 Jun 2019 05:21    RADIOLOGY & ADDITIONAL TESTS:  < from: Xray Chest 1 View AP/PA (06.10.19 @ 19:07) >  Heart size and the mediastinum cannot be accurately evaluated on this   projection.  There is focal left retrocardiac opacity, possibly subsegmental   atelectasis. The remaining lungs are clear. No pleural effusion or   pneumothorax is seen.  There is pneumoperitoneum consistent with recent nephrectomy.  A 1.1 cm radiopaque density projects over the stomach bubble/left base,   of uncertain etiology. Question whether this is part of the   aforementioned left retrocardiac subsegmental atelectasis A portion of a   tooth is not excluded.    IMPRESSION:  Focal left retrocardiac opacity, possibly subsegmental   atelectasis.    1.1 cm radiopaque density projecting over the stomachbubble/left base,   of uncertain etiology. This may represent part of the aforementioned   subsegmental atelectasis. A portion of a tooth is not excluded.   Clinically correlate as to the size of the part of the tooth which was   broken.    Imaging Personally Reviewed:    Consultant(s) Notes Reviewed:      Care Discussed with Consultants/Other Providers: urology re overall care

## 2019-06-11 NOTE — DISCHARGE NOTE NURSING/CASE MANAGEMENT/SOCIAL WORK - NSDCDPATPORTLINK_GEN_ALL_CORE
You can access the [facility] Patient Portal, offered by [facility], by registering with the following website: [email or web address]

## 2019-06-11 NOTE — DISCHARGE NOTE PROVIDER - NSDCACTIVITY_GEN_ALL_CORE
Stairs allowed/No heavy lifting/straining/Showering allowed/Walking - Indoors allowed/Walking - Outdoors allowed

## 2019-06-11 NOTE — PROGRESS NOTE ADULT - PROBLEM SELECTOR PLAN 3
post op management per urology, ambulating well, bowel function returning, pain control, IS, DVT ppx (SC heparin).  likely d/c home today

## 2019-06-11 NOTE — PROGRESS NOTE ADULT - SUBJECTIVE AND OBJECTIVE BOX
Subjective    Pt states he feels well, got OOB yesterday, pain controlled, no flatus, no N/V.    Objective    Vital signs  T(F): , Max: 98.4 (06-11-19 @ 01:38)  HR: 66 (06-11-19 @ 05:36)  BP: 142/66 (06-11-19 @ 05:36)  SpO2: 99% (06-11-19 @ 05:36)  Wt(kg): --    Output     OUT:    Indwelling Catheter - Urethral: 1125 mL  Total OUT: 1125 mL    Total NET: -1125 mL          Gen NAD  Abd S/mild distension/appropriately tender   Wilburn pink tinge    Labs    CBC/BMP pending    Imaging    CXR negative

## 2019-06-11 NOTE — DISCHARGE NOTE PROVIDER - HOSPITAL COURSE
Pt had L. lap partial neph yesterday; did well; ambulating; had BM; jade reg diet; voiding; home today

## 2019-06-11 NOTE — DISCHARGE NOTE NURSING/CASE MANAGEMENT/SOCIAL WORK - NSDCPNINST_GEN_ALL_CORE
Call MD for any c/o difficulty urinating,  bloody urine, abdominal discomfort not relieved after medicated for pain, fever and a return appointment.  Take over the counter stool softener to prevent constipation that can be a side effect from taking pain medication.

## 2019-06-11 NOTE — PROGRESS NOTE ADULT - ASSESSMENT
69M GABY, [place], kidney stones, GERD, Mathews's esophagus, cath January 2019 mild diffuse CAD/nonobstructive CAD, back pain, pt had noted left kidney cyst (on CT scan with w/u for lower back pain) concerning for malignancy.  Now s/p L lap partial nephrectomy.

## 2019-06-11 NOTE — DISCHARGE NOTE PROVIDER - NSDCCPCAREPLAN_GEN_ALL_CORE_FT
PRINCIPAL DISCHARGE DIAGNOSIS  Diagnosis: Kidney mass  Assessment and Plan of Treatment: Drink plenty of fluids.  No heavy lifting (greater than 10 pounds) or straining for 4 to 6 weeks.  You may shower, just pat white strips dry, they will fall off in a few weeks. You may drive in 5 days if not needing pain meds  Call the office if you have fever greater than 101, difficulty urinating, pain not relieved with pain medication, nausea/vomiting..        SECONDARY DISCHARGE DIAGNOSES  Diagnosis: Kidney mass  Assessment and Plan of Treatment: Kidney mass

## 2019-06-11 NOTE — DISCHARGE NOTE PROVIDER - CARE PROVIDER_API CALL
Angel Moody; ASHUTOSH)  Urology  [address] [place] [address], [place] [address]  Phone: [phone]  Fax: [phone]  Follow Up Time: 1 month

## 2019-06-12 ENCOUNTER — APPOINTMENT (OUTPATIENT)
Dept: INTERVENTIONAL RADIOLOGY/VASCULAR | Facility: CLINIC | Age: 69
End: 2019-06-12

## 2019-06-12 PROBLEM — N28.1 CYST OF KIDNEY, ACQUIRED: Chronic | Status: ACTIVE | Noted: 2019-05-30

## 2019-06-18 ENCOUNTER — EMERGENCY (EMERGENCY)
Facility: HOSPITAL | Age: 69
LOS: 1 days | Discharge: HOME | End: 2019-06-18
Attending: EMERGENCY MEDICINE | Admitting: EMERGENCY MEDICINE
Payer: MEDICARE

## 2019-06-18 VITALS
DIASTOLIC BLOOD PRESSURE: 73 MMHG | SYSTOLIC BLOOD PRESSURE: 136 MMHG | HEART RATE: 53 BPM | OXYGEN SATURATION: 96 % | TEMPERATURE: 98 F | RESPIRATION RATE: 16 BRPM

## 2019-06-18 VITALS
OXYGEN SATURATION: 96 % | RESPIRATION RATE: 16 BRPM | TEMPERATURE: 98 F | HEART RATE: 57 BPM | DIASTOLIC BLOOD PRESSURE: 65 MMHG | SYSTOLIC BLOOD PRESSURE: 150 MMHG | HEIGHT: 69 IN | WEIGHT: 214.95 LBS

## 2019-06-18 DIAGNOSIS — Z98.890 OTHER SPECIFIED POSTPROCEDURAL STATES: Chronic | ICD-10-CM

## 2019-06-18 DIAGNOSIS — Z98.52 VASECTOMY STATUS: Chronic | ICD-10-CM

## 2019-06-18 LAB
ALBUMIN SERPL ELPH-MCNC: 3 G/DL — LOW (ref 3.3–5)
ALP SERPL-CCNC: 72 U/L — SIGNIFICANT CHANGE UP (ref 40–120)
ALT FLD-CCNC: 46 U/L — SIGNIFICANT CHANGE UP (ref 12–78)
ANION GAP SERPL CALC-SCNC: 7 MMOL/L — SIGNIFICANT CHANGE UP (ref 5–17)
APPEARANCE UR: CLEAR — SIGNIFICANT CHANGE UP
AST SERPL-CCNC: 14 U/L — LOW (ref 15–37)
BACTERIA # UR AUTO: ABNORMAL
BASOPHILS # BLD AUTO: 0.04 K/UL — SIGNIFICANT CHANGE UP (ref 0–0.2)
BASOPHILS NFR BLD AUTO: 0.4 % — SIGNIFICANT CHANGE UP (ref 0–2)
BILIRUB SERPL-MCNC: 0.7 MG/DL — SIGNIFICANT CHANGE UP (ref 0.2–1.2)
BILIRUB UR-MCNC: NEGATIVE — SIGNIFICANT CHANGE UP
BUN SERPL-MCNC: 24 MG/DL — HIGH (ref 7–23)
CALCIUM SERPL-MCNC: 8.9 MG/DL — SIGNIFICANT CHANGE UP (ref 8.5–10.1)
CHLORIDE SERPL-SCNC: 108 MMOL/L — SIGNIFICANT CHANGE UP (ref 96–108)
CO2 SERPL-SCNC: 26 MMOL/L — SIGNIFICANT CHANGE UP (ref 22–31)
COLOR SPEC: YELLOW — SIGNIFICANT CHANGE UP
CREAT SERPL-MCNC: 1.3 MG/DL — SIGNIFICANT CHANGE UP (ref 0.5–1.3)
DIFF PNL FLD: ABNORMAL
EOSINOPHIL # BLD AUTO: 0.22 K/UL — SIGNIFICANT CHANGE UP (ref 0–0.5)
EOSINOPHIL NFR BLD AUTO: 2.1 % — SIGNIFICANT CHANGE UP (ref 0–6)
EPI CELLS # UR: NEGATIVE — SIGNIFICANT CHANGE UP
GLUCOSE SERPL-MCNC: 124 MG/DL — HIGH (ref 70–99)
GLUCOSE UR QL: NEGATIVE — SIGNIFICANT CHANGE UP
HCT VFR BLD CALC: 37.6 % — LOW (ref 39–50)
HGB BLD-MCNC: 12.6 G/DL — LOW (ref 13–17)
IMM GRANULOCYTES NFR BLD AUTO: 0.4 % — SIGNIFICANT CHANGE UP (ref 0–1.5)
KETONES UR-MCNC: NEGATIVE — SIGNIFICANT CHANGE UP
LEUKOCYTE ESTERASE UR-ACNC: NEGATIVE — SIGNIFICANT CHANGE UP
LIDOCAIN IGE QN: 240 U/L — SIGNIFICANT CHANGE UP (ref 73–393)
LYMPHOCYTES # BLD AUTO: 19.4 % — SIGNIFICANT CHANGE UP (ref 13–44)
LYMPHOCYTES # BLD AUTO: 2.01 K/UL — SIGNIFICANT CHANGE UP (ref 1–3.3)
MCHC RBC-ENTMCNC: 31 PG — SIGNIFICANT CHANGE UP (ref 27–34)
MCHC RBC-ENTMCNC: 33.5 GM/DL — SIGNIFICANT CHANGE UP (ref 32–36)
MCV RBC AUTO: 92.6 FL — SIGNIFICANT CHANGE UP (ref 80–100)
MONOCYTES # BLD AUTO: 0.75 K/UL — SIGNIFICANT CHANGE UP (ref 0–0.9)
MONOCYTES NFR BLD AUTO: 7.2 % — SIGNIFICANT CHANGE UP (ref 2–14)
NEUTROPHILS # BLD AUTO: 7.31 K/UL — SIGNIFICANT CHANGE UP (ref 1.8–7.4)
NEUTROPHILS NFR BLD AUTO: 70.5 % — SIGNIFICANT CHANGE UP (ref 43–77)
NITRITE UR-MCNC: NEGATIVE — SIGNIFICANT CHANGE UP
NRBC # BLD: 0 /100 WBCS — SIGNIFICANT CHANGE UP (ref 0–0)
PH UR: 5 — SIGNIFICANT CHANGE UP (ref 5–8)
PLATELET # BLD AUTO: 266 K/UL — SIGNIFICANT CHANGE UP (ref 150–400)
POTASSIUM SERPL-MCNC: 3.8 MMOL/L — SIGNIFICANT CHANGE UP (ref 3.5–5.3)
POTASSIUM SERPL-SCNC: 3.8 MMOL/L — SIGNIFICANT CHANGE UP (ref 3.5–5.3)
PROT SERPL-MCNC: 6.9 G/DL — SIGNIFICANT CHANGE UP (ref 6–8.3)
PROT UR-MCNC: NEGATIVE — SIGNIFICANT CHANGE UP
RBC # BLD: 4.06 M/UL — LOW (ref 4.2–5.8)
RBC # FLD: 12.2 % — SIGNIFICANT CHANGE UP (ref 10.3–14.5)
RBC CASTS # UR COMP ASSIST: ABNORMAL /HPF (ref 0–4)
SODIUM SERPL-SCNC: 141 MMOL/L — SIGNIFICANT CHANGE UP (ref 135–145)
SP GR SPEC: 1 — LOW (ref 1.01–1.02)
UROBILINOGEN FLD QL: NEGATIVE — SIGNIFICANT CHANGE UP
WBC # BLD: 10.37 K/UL — SIGNIFICANT CHANGE UP (ref 3.8–10.5)
WBC # FLD AUTO: 10.37 K/UL — SIGNIFICANT CHANGE UP (ref 3.8–10.5)
WBC UR QL: SIGNIFICANT CHANGE UP

## 2019-06-18 PROCEDURE — 99284 EMERGENCY DEPT VISIT MOD MDM: CPT | Mod: 25

## 2019-06-18 PROCEDURE — 96360 HYDRATION IV INFUSION INIT: CPT | Mod: XU

## 2019-06-18 PROCEDURE — 85027 COMPLETE CBC AUTOMATED: CPT

## 2019-06-18 PROCEDURE — 36415 COLL VENOUS BLD VENIPUNCTURE: CPT

## 2019-06-18 PROCEDURE — 81001 URINALYSIS AUTO W/SCOPE: CPT

## 2019-06-18 PROCEDURE — 80053 COMPREHEN METABOLIC PANEL: CPT

## 2019-06-18 PROCEDURE — 83690 ASSAY OF LIPASE: CPT

## 2019-06-18 PROCEDURE — 74177 CT ABD & PELVIS W/CONTRAST: CPT | Mod: 26

## 2019-06-18 PROCEDURE — 74177 CT ABD & PELVIS W/CONTRAST: CPT

## 2019-06-18 RX ORDER — SODIUM CHLORIDE 9 MG/ML
1000 INJECTION INTRAMUSCULAR; INTRAVENOUS; SUBCUTANEOUS ONCE
Refills: 0 | Status: COMPLETED | OUTPATIENT
Start: 2019-06-18 | End: 2019-06-18

## 2019-06-18 RX ORDER — CEPHALEXIN 500 MG
500 CAPSULE ORAL ONCE
Refills: 0 | Status: COMPLETED | OUTPATIENT
Start: 2019-06-18 | End: 2019-06-18

## 2019-06-18 RX ADMIN — SODIUM CHLORIDE 1000 MILLILITER(S): 9 INJECTION INTRAMUSCULAR; INTRAVENOUS; SUBCUTANEOUS at 03:25

## 2019-06-18 RX ADMIN — Medication 500 MILLIGRAM(S): at 03:40

## 2019-06-18 RX ADMIN — SODIUM CHLORIDE 1000 MILLILITER(S): 9 INJECTION INTRAMUSCULAR; INTRAVENOUS; SUBCUTANEOUS at 02:30

## 2019-06-18 NOTE — ED ADULT NURSE NOTE - NSIMPLEMENTINTERV_GEN_ALL_ED
Implemented All Universal Safety Interventions:  [place] to call system. Call bell, personal items and telephone within reach. Instruct patient to call for assistance. Room bathroom lighting operational. Non-slip footwear when patient is off stretcher. Physically safe environment: no spills, clutter or unnecessary equipment. Stretcher in lowest position, wheels locked, appropriate side rails in place.

## 2019-06-18 NOTE — ED ADULT NURSE NOTE - CHPI ED NUR SYMPTOMS NEG
no blood in stool/no burning urination/no chills/no diarrhea/no fever/no vomiting/no dysuria/no hematuria/no nausea

## 2019-06-18 NOTE — ED ADULT NURSE NOTE - OBJECTIVE STATEMENT
Pt to ED c/c low abdominal pain tonight. Pt is post op kidney surgery to remove lesion. Noted with incidental redness to surgical wound left lower abdomen, ecchymosis central lower abdomen. Pt is tender to right lower quadrant on palpation, mildly distended

## 2019-06-18 NOTE — ED PROVIDER NOTE - OBJECTIVE STATEMENT
70yo male who presents with abd apin alana. pt had nephrectomy a week ago and woke up tonite with sudden abd pain, no vomiting or diarrhea, no fever, chills

## 2019-07-01 ENCOUNTER — APPOINTMENT (OUTPATIENT)
Dept: UROLOGY | Facility: CLINIC | Age: 69
End: 2019-07-01
Payer: MEDICARE

## 2019-07-01 DIAGNOSIS — C65.9 MALIGNANT NEOPLASM OF UNSPECIFIED RENAL PELVIS: ICD-10-CM

## 2019-07-01 PROCEDURE — 99024 POSTOP FOLLOW-UP VISIT: CPT

## 2019-07-01 NOTE — HISTORY OF PRESENT ILLNESS
[FreeTextEntry1] : Renal mass noted on May 6th for generalized abdominal pain . Renal mass was incidentally found . 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  [None] : no symptoms

## 2019-07-01 NOTE — REVIEW OF SYSTEMS
[Feeling Tired] : feeling tired [see HPI] : see HPI [Skin Wound] : skin wound [Negative] : Heme/Lymph

## 2019-07-01 NOTE — PHYSICAL EXAM
[General Appearance - Well Developed] : well developed [General Appearance - Well Nourished] : well nourished [Abdomen Soft] : soft [FreeTextEntry1] : incision sites healing Left trocar site with 2 CM hematoma  [Skin Color & Pigmentation] : normal skin color and pigmentation [Heart Rate And Rhythm] : Heart rate and rhythm were normal [] : no respiratory distress [Oriented To Time, Place, And Person] : oriented to person, place, and time [Normal Station and Gait] : the gait and station were normal for the patient's age [No Focal Deficits] : no focal deficits [No Palpable Adenopathy] : no palpable adenopathy

## 2019-07-01 NOTE — ASSESSMENT
[FreeTextEntry1] : He is doing well . His GI function has returned . His appetite is back . He is working on his new house . Reminded him not to lift anything heavy . He is working in the sun . Recommend increase in his hydration . Sun screen for his incisions . He is complaining of a right groin "rope " tenderness . He has a right inguinal hernia . He will keep an eye on it . if pain increases he will follow up with a surgeon .\par Discussed his pathology report and copies sent to [facility] . \par He will contact [facility] for possible benefits due to RCC . \par Follow up in 6 months with an ultrasound

## 2019-08-06 ENCOUNTER — APPOINTMENT (OUTPATIENT)
Dept: UROLOGY | Facility: CLINIC | Age: 69
End: 2019-08-06

## 2019-12-27 ENCOUNTER — APPOINTMENT (OUTPATIENT)
Dept: UROLOGY | Facility: CLINIC | Age: 69
End: 2019-12-27
Payer: MEDICARE

## 2019-12-27 ENCOUNTER — OUTPATIENT (OUTPATIENT)
Dept: OUTPATIENT SERVICES | Facility: HOSPITAL | Age: 69
LOS: 1 days | End: 2019-12-27
Payer: MEDICARE

## 2019-12-27 VITALS
HEIGHT: 69 IN | BODY MASS INDEX: 31.84 KG/M2 | HEART RATE: 62 BPM | DIASTOLIC BLOOD PRESSURE: 75 MMHG | RESPIRATION RATE: 17 BRPM | WEIGHT: 215 LBS | SYSTOLIC BLOOD PRESSURE: 147 MMHG | TEMPERATURE: 98.4 F

## 2019-12-27 DIAGNOSIS — R35.0 FREQUENCY OF MICTURITION: ICD-10-CM

## 2019-12-27 DIAGNOSIS — Z98.890 OTHER SPECIFIED POSTPROCEDURAL STATES: Chronic | ICD-10-CM

## 2019-12-27 DIAGNOSIS — Z98.52 VASECTOMY STATUS: Chronic | ICD-10-CM

## 2019-12-27 PROCEDURE — 76775 US EXAM ABDO BACK WALL LIM: CPT | Mod: 26

## 2019-12-27 PROCEDURE — 99214 OFFICE O/P EST MOD 30 MIN: CPT | Mod: 25

## 2019-12-27 PROCEDURE — 76775 US EXAM ABDO BACK WALL LIM: CPT

## 2019-12-27 RX ORDER — PANTOPRAZOLE SODIUM 40 MG/1
40 GRANULE, DELAYED RELEASE ORAL
Refills: 0 | Status: ACTIVE | COMMUNITY

## 2019-12-27 NOTE — PHYSICAL EXAM
[General Appearance - Well Developed] : well developed [General Appearance - Well Nourished] : well nourished [Abdomen Soft] : soft [Skin Color & Pigmentation] : normal skin color and pigmentation [Skin Turgor] : supple [Heart Rate And Rhythm] : Heart rate and rhythm were normal [Oriented To Time, Place, And Person] : oriented to person, place, and time [] : no respiratory distress [No Focal Deficits] : no focal deficits [Normal Station and Gait] : the gait and station were normal for the patient's age [No Palpable Adenopathy] : no palpable adenopathy

## 2019-12-27 NOTE — ASSESSMENT
[FreeTextEntry1] : Left partial nephrectomy done in June 2019 for RCC pT1a FG 2 . He denies hematuria . He is doing well .\par Follow up in 1 year

## 2019-12-27 NOTE — HISTORY OF PRESENT ILLNESS
[None] : no symptoms [FreeTextEntry1] : Left partial nephrectomy for RCC pT1a FG 2 Renal ultrasound done today

## 2020-01-06 DIAGNOSIS — N28.89 OTHER SPECIFIED DISORDERS OF KIDNEY AND URETER: ICD-10-CM

## 2020-01-06 DIAGNOSIS — C64.9 MALIGNANT NEOPLASM OF UNSPECIFIED KIDNEY, EXCEPT RENAL PELVIS: ICD-10-CM

## 2020-04-20 ENCOUNTER — TRANSCRIPTION ENCOUNTER (OUTPATIENT)
Age: 70
End: 2020-04-20

## 2020-06-12 NOTE — REVIEW OF SYSTEMS
[Painful Gisela] : painful Gisela [Genital bacterial infection] : genital bacterial infection [Loss of interest] : loss of interest in sexual activity [Genital yeast infection] : genital yeast infection [Poor quality erections] : Poor quality erections [Sexually Transmitted Disease (STD)] : sexually transmitted disease [No erections] : no erections [History of kidney stones] : history of kidney stones [Wake up at night to urinate  How many times?  ___] : wakes up to urinate [unfilled] times during the night [Negative] : Heme/Lymph

## 2020-06-15 ENCOUNTER — APPOINTMENT (OUTPATIENT)
Dept: UROLOGY | Facility: CLINIC | Age: 70
End: 2020-06-15
Payer: MEDICARE

## 2020-06-15 DIAGNOSIS — Z80.42 FAMILY HISTORY OF MALIGNANT NEOPLASM OF PROSTATE: ICD-10-CM

## 2020-06-15 PROCEDURE — 99203 OFFICE O/P NEW LOW 30 MIN: CPT | Mod: 95

## 2020-06-15 RX ORDER — PNV NO.95/FERROUS FUM/FOLIC AC 28MG-0.8MG
1000 TABLET ORAL
Refills: 0 | Status: ACTIVE | COMMUNITY

## 2020-06-15 NOTE — HISTORY OF PRESENT ILLNESS
[Other Location: e.g. School (Enter Location, City,State)___] : at [unfilled], at the time of the visit. [Spouse] : spouse [Verbal consent obtained from patient] : the patient, [unfilled] [FreeTextEntry1] : Verbal consent given on Rosaura 15, 2020 by the patient.\par \par The patient-doctor relationship has been established in a face to face fashion via real time video/audio HIPAA compliant communication using telemedicine software. The patient's identity has been confirmed. The patient was previously emailed a copy of the telemedicine consent. They have had a chance to review and has now given verbal consent and has requested care to be assessed and treated through telemedicine and understands there maybe limitations in this process and they may need further follow up care in the office and or hospital settings.\par \par Dear Dr. Jason Mclaughlin ([place]), [facility] ([phone])\par \par Thank you so much for the referral to help care for your patient.\par \par Chief Complaint Elevated PSA\par Date of first visit: 06/15/2020\par Occupation:  [place]\par \par JASON MURRAY  is a 70 year old , ,  with a prior renal malignancy who presents for evaluation of prostate nodule and a PSA 2.03 on 04/08/2020. His IntelliScore was 47% risk of HG dz 04/20. His prior PSA was 2.07 in 01/2019. Patient has a FMHx of Prostate Cancer.  he also had COVID with out acute symptoms.\par \par MRI ([facility]) official read on 05/30/2020. 42 cc, PSAD 0.04, PIRADS 4 lesion measuring 11 x 5 mm in left anterior midgland PZ. No LAD. No EPE. No Bony Lesions. The images have been reviewed and clinical implications discussed with the patient.  The NAYE was + in right mid which corresponds with a second lesion i am adding for the biopsy 4 out of 5. \par \par Patient is also s/p Left partial nephrectomy in 06/19 for 1.8 cm lesion path- Clear Cell RCC that has been followed by Dr. Moody, last visit in 12/2019.\par \par Prostate cancer screening: the patient and I spoke at length about prostate cancer screening, its risks and its benefits. The patient has (Age, NAYE, Fam ) 3 risk factors for prostate cancer.  He understands that many men with prostate cancer will die with the disease rather than of it and we also discussed the results large multi-center American and  prostate cancer screening trials. He also understands that PSA in and of itself does not diagnose prostate cancer but only assesses risk to a certain degree. The patient understands that to definitively screen for prostate cancer, a biopsy is required and this procedure has risks, including bleeding, infection, ED and urinary retention. The patient opted to biopsy.\par \par Urinary symptoms include nocturia x 1, \par \par 06/15/2020 \par IPSS 2 QOL 1 \par NIKKI 24 \par \par The patient denies fevers, chills, nausea and or vomiting and no unexplained weight loss.\par \par All pertinent laboratory, films and physician notes were reviewed.  Questionnaire results were discussed with patient.\par \par I spent 31 minutes of non-face to face time reviewing the patient's records, chart, uploading processing MR images, transferring MR images from PACS to an independent workstation, reviewing images on independent workstation, speaking with their physician and planning their prostate biopsy and preparation of an upcoming visit for 06/15/2020 .  The imaging and planning was highly complex and took this entire time. There is a second lesion i am adding given + NAYE and findings.

## 2020-06-15 NOTE — ASSESSMENT
[FreeTextEntry1] : 70 year old  male with a history of renal cell carcinoma treated by Dr. Moody (recurrence free) - the patient was found to have a positive NAYE that trigger an evaluation - exomedx high risk (47.9% GG2 or >) prostate cancer.  two lesions to biopsy plus std 12 core\par \par 1. labs at [facility]\par 2. medical clearance\par 3. Fusoin biopsy TP at [facility]\par \par Thank you very much for allowing me to assist in the care of this patient. Should you have any additional questions or concerns please do not hesitate to contact me.\par \par \par Sincerely,\par \par \par Elio Emanuel D.O.\par  of Urology and Radiology\par  of Urology at [facility]\par System Director for Prostate Cancer\par [address] [place] [address], [address]\par Phone: [phone]

## 2020-06-15 NOTE — PHYSICAL EXAM
[General Appearance - Well Developed] : well developed [General Appearance - Well Nourished] : well nourished [] : no respiratory distress [Abdomen Soft] : soft [Penis Abnormality] : normal circumcised penis [No Focal Deficits] : no focal deficits [Oriented To Time, Place, And Person] : oriented to person, place, and time [Not Anxious] : not anxious [FreeTextEntry1] : sense of smell returning after covid

## 2020-06-17 LAB
ANION GAP SERPL CALC-SCNC: 13 MMOL/L
APPEARANCE: CLEAR
BACTERIA: NEGATIVE
BASOPHILS # BLD AUTO: 0.05 K/UL
BASOPHILS NFR BLD AUTO: 0.8 %
BILIRUBIN URINE: NEGATIVE
BLOOD URINE: NEGATIVE
BUN SERPL-MCNC: 28 MG/DL
CALCIUM SERPL-MCNC: 9.7 MG/DL
CHLORIDE SERPL-SCNC: 103 MMOL/L
CO2 SERPL-SCNC: 26 MMOL/L
COLOR: YELLOW
CREAT SERPL-MCNC: 1.27 MG/DL
EOSINOPHIL # BLD AUTO: 0.08 K/UL
EOSINOPHIL NFR BLD AUTO: 1.2 %
GLUCOSE QUALITATIVE U: NEGATIVE
GLUCOSE SERPL-MCNC: 74 MG/DL
HCT VFR BLD CALC: 48.9 %
HGB BLD-MCNC: 15.5 G/DL
HYALINE CASTS: 0 /LPF
IMM GRANULOCYTES NFR BLD AUTO: 1.1 %
KETONES URINE: NEGATIVE
LEUKOCYTE ESTERASE URINE: NEGATIVE
LYMPHOCYTES # BLD AUTO: 1.69 K/UL
LYMPHOCYTES NFR BLD AUTO: 25.8 %
MAN DIFF?: NORMAL
MCHC RBC-ENTMCNC: 30.5 PG
MCHC RBC-ENTMCNC: 31.7 GM/DL
MCV RBC AUTO: 96.1 FL
MICROSCOPIC-UA: NORMAL
MONOCYTES # BLD AUTO: 0.56 K/UL
MONOCYTES NFR BLD AUTO: 8.6 %
NEUTROPHILS # BLD AUTO: 4.09 K/UL
NEUTROPHILS NFR BLD AUTO: 62.5 %
NITRITE URINE: NEGATIVE
PH URINE: 6
PLATELET # BLD AUTO: 227 K/UL
POTASSIUM SERPL-SCNC: 4.5 MMOL/L
PROTEIN URINE: NEGATIVE
PSA FREE FLD-MCNC: 18 %
PSA FREE SERPL-MCNC: 0.56 NG/ML
PSA SERPL-MCNC: 3.16 NG/ML
RBC # BLD: 5.09 M/UL
RBC # FLD: 12.3 %
RED BLOOD CELLS URINE: 1 /HPF
SODIUM SERPL-SCNC: 142 MMOL/L
SPECIFIC GRAVITY URINE: 1.02
SQUAMOUS EPITHELIAL CELLS: 0 /HPF
UROBILINOGEN URINE: NORMAL
WBC # FLD AUTO: 6.54 K/UL
WHITE BLOOD CELLS URINE: 0 /HPF

## 2020-06-18 LAB
24R-OH-CALCIDIOL SERPL-MCNC: 49.1 PG/ML
BACTERIA UR CULT: NORMAL

## 2020-06-24 ENCOUNTER — TRANSCRIPTION ENCOUNTER (OUTPATIENT)
Age: 70
End: 2020-06-24

## 2020-06-25 ENCOUNTER — OUTPATIENT (OUTPATIENT)
Dept: OUTPATIENT SERVICES | Facility: HOSPITAL | Age: 70
LOS: 1 days | Discharge: HOME | End: 2020-06-25
Payer: MEDICARE

## 2020-06-25 ENCOUNTER — RESULT REVIEW (OUTPATIENT)
Age: 70
End: 2020-06-25

## 2020-06-25 ENCOUNTER — APPOINTMENT (OUTPATIENT)
Dept: UROLOGY | Facility: HOSPITAL | Age: 70
End: 2020-06-25

## 2020-06-25 DIAGNOSIS — Z98.52 VASECTOMY STATUS: Chronic | ICD-10-CM

## 2020-06-25 DIAGNOSIS — Z98.890 OTHER SPECIFIED POSTPROCEDURAL STATES: Chronic | ICD-10-CM

## 2020-06-25 PROCEDURE — 88344 IMHCHEM/IMCYTCHM EA MLT ANTB: CPT | Mod: 26

## 2020-06-25 PROCEDURE — 64450 NJX AA&/STRD OTHER PN/BRANCH: CPT | Mod: 59

## 2020-06-25 PROCEDURE — 76872 US TRANSRECTAL: CPT | Mod: 26

## 2020-06-25 PROCEDURE — 55706 BX PRST8 NDL SAT SAMPLING: CPT

## 2020-06-25 PROCEDURE — 76942 ECHO GUIDE FOR BIOPSY: CPT | Mod: 26,59

## 2020-06-25 PROCEDURE — 88305 TISSUE EXAM BY PATHOLOGIST: CPT | Mod: 26

## 2020-06-29 LAB — SURGICAL PATHOLOGY STUDY: SIGNIFICANT CHANGE UP

## 2020-07-21 ENCOUNTER — APPOINTMENT (OUTPATIENT)
Dept: UROLOGY | Facility: CLINIC | Age: 70
End: 2020-07-21
Payer: MEDICARE

## 2020-07-21 PROCEDURE — 99215 OFFICE O/P EST HI 40 MIN: CPT | Mod: 95

## 2020-07-21 NOTE — HISTORY OF PRESENT ILLNESS
[Home] : at home, [unfilled] , at the time of the visit. [Medical Office: ([facility])___] : at the medical office located in  [Verbal consent obtained from patient] : the patient, [unfilled] [Spouse] : spouse [FreeTextEntry1] : Dear Dr. Kee Mclaughlin ([place]), [facility] ([phone])\par \par Chief Complaint: follow up, discuss Genomic testing\par Date of first visit: 06/15/2020\par Occupation:  [employer]\par \par KEE MURRAY  is a 70 year old ~race man, with recently dx PCa,who presents for discussion of Genomic test results and outline a final plan.\par \par s/p TP MR Fusion Prostate Biopsy on 06/29/20.\par path: 1) GG1, right anterior base, involving 70%, measuring 9 mm 2) GG1 right posterior apex, involving 25%, measuring 3 mm 3) GG2, left apex PZa (5% pattern 4), involving 5%, measuring 1 mm.  The RAB and Left [place] PZa were both visible on MRI\par \par Decipher test on 06/30/2020: 18.2% Risk of HG dz,  2.4% risk of mets w/i 5 years, 3.3% PCSM at 1- years\par \par He had a prostate nodule and a PSA 2.03 on 04/08/2020. His IntelliScore was 47% risk of HG dz 04/20. His prior PSA was 2.07 in 01/2019. Patient has a FMHx of Prostate Cancer. \par \par MRI ([facility]) official read on 05/30/2020. 42 cc, PSAD 0.04, PIRADS 4 lesion measuring 11 x 5 mm in left anterior midgland PZ. No LAD. No EPE. No Bony Lesions. The images have been reviewed and clinical implications discussed with the patient. The NAYE was + in right mid which corresponds with a second lesion i am adding for the biopsy 4 out of 5. \par \par Patient is also s/p Left partial nephrectomy in 06/19 for 1.8 cm lesion path- Clear Cell RCC that has been followed by Dr. Moody, last visit in 12/2019.\par \par Urinary symptoms include nocturia x 1\par \par 06/15/2020\par IPSS 2 QOL 1 \par NIKKI 24\par \par The patient denies fevers, chills, nausea and or vomiting and no unexplained weight loss.\par \par All pertinent laboratory, films and physician notes were reviewed.  Questionnaire results were discussed with patient.\par \par Greater than 50% of this 40 minute visit was spent discussing the options for treatment and management of the patient's localized prostate cancer. \par \par Discussion with patient: Risks/Benefits/FDA recommendations for prostate cancer screening, natural history of prostate cancer, the concept of "competing risks", options for treatment including: active surveillance, open and laparoscopic (Robotic) radical prostatectomy, external beam radiation therapy, interstitial brachytherapy ("seeds"), combined therapies, hormonal therapies, orchiectomy, and cryotherapy. The potential side effects, early and delayed risks, and effectiveness of each treatment was discussed. The specific details of this patient's disease and their bearing on the above were discussed. The patient was offered the opportunity to meet with the Radiation Oncologists. The patient asked appropriate contextual questions indicating a good level of understanding.\par \par We discussed the surgical options for management including robotic and open prostatectomy. The patient understands that the risks of these procedures include bleeding infection, damage to the rectum and surrounding organs as well as ED and urinary incontinence, both of which may be persistent. The advantages include removing the entire prostate for more accurate pathologic staging. There is more blood loss with the open approach than with the laparoscopic approach.\par \par We also discussed the options of radiation (external beam, brachytherapy or both). Definitive brachytherapy at our institution is done with I-125 (T1/2 60.2 days) which has high energy but slightly more toxicity. Combination therapy is done with Pd-103 (T1/2 17 days) and IGRT. The patient understands that the risk of radiation include increased LUTS, diarrhea, hematuria, difficulty urinating, ED, and urinary frequency. Based upon the patient's PSA/pathology and risk stratification the patient may need androgen deprivation in the neoadjuvant and adjuvant setting if he chooses a radiation monotherapy.\par \par We also discussed active surveillance. The patient understands that this is not a treatment, but a way of monitoring potentially indolent disease. The patient understands that this has minimal side effects but there is a risk of disease progression.\par \par We have discussed the use of focal therapy and cancer control. The goal be to treat the high risk area and follow the low risk areas which are not clinically significant. I describe the difference between whole gland therapy and focal therapy respect to cancerous control versus cancer cure. We talked about the risks of focal therapy damage to adjacent structures however the probability of urinary incontinence and erectile dysfunction is lower. \par \par The patient and I discussed all of these options as well as their risks and benefits and I believe I answered his questions. Based on the patient's disease characteristics I feel the patient would be a good candidate for Surgery, Radiation, Focal and or AS\par The patient decided to talk with daughter (doctor) and we will follow up in a few weeks.\par

## 2020-07-21 NOTE — ASSESSMENT
[FreeTextEntry1] : 70 year old  male with a history of renal cell carcinoma treated by Dr. Moody (recurrence free) - the patient was found to have a positive NAYE that trigger an evaluation - exomedx high risk (47.9% GG2 or >) prostate cancer. \par \par Prostate Cancer\par 1) GG1, right anterior base, involving 70%, measuring 9 mm 2) GG1 right posterior apex, involving 25%, measuring 3 mm 3) GG2, left apex PZa (5% pattern 4), involving 5%, measuring 1 mm.  The RAB and Left [place] PZa were both visible on MRI\par \par 1. We will determine next course of action they will have a family plan\par \par Thank you very much for allowing me to assist in the care of this patient. Should you have any additional questions or concerns please do not hesitate to contact me.\par \par \par Sincerely,\par \par \par Elio Emanuel D.O.\par  of Urology and Radiology\par  of Urology at [facility]\par System Director for Prostate Cancer\par [address] [place] [address], [address]\par Phone: [phone]

## 2020-09-18 ENCOUNTER — OUTPATIENT (OUTPATIENT)
Dept: OUTPATIENT SERVICES | Facility: HOSPITAL | Age: 70
LOS: 1 days | End: 2020-09-18

## 2020-09-18 VITALS
HEIGHT: 69.5 IN | HEART RATE: 59 BPM | WEIGHT: 225.97 LBS | RESPIRATION RATE: 16 BRPM | TEMPERATURE: 98 F | SYSTOLIC BLOOD PRESSURE: 148 MMHG | OXYGEN SATURATION: 98 % | DIASTOLIC BLOOD PRESSURE: 76 MMHG

## 2020-09-18 DIAGNOSIS — C61 MALIGNANT NEOPLASM OF PROSTATE: ICD-10-CM

## 2020-09-18 DIAGNOSIS — Z98.890 OTHER SPECIFIED POSTPROCEDURAL STATES: Chronic | ICD-10-CM

## 2020-09-18 DIAGNOSIS — Z90.5 ACQUIRED ABSENCE OF KIDNEY: Chronic | ICD-10-CM

## 2020-09-18 DIAGNOSIS — Z98.52 VASECTOMY STATUS: Chronic | ICD-10-CM

## 2020-09-18 LAB
ANION GAP SERPL CALC-SCNC: 12 MMO/L — SIGNIFICANT CHANGE UP (ref 7–14)
BUN SERPL-MCNC: 26 MG/DL — HIGH (ref 7–23)
CALCIUM SERPL-MCNC: 9.4 MG/DL — SIGNIFICANT CHANGE UP (ref 8.4–10.5)
CHLORIDE SERPL-SCNC: 104 MMOL/L — SIGNIFICANT CHANGE UP (ref 98–107)
CO2 SERPL-SCNC: 26 MMOL/L — SIGNIFICANT CHANGE UP (ref 22–31)
CREAT SERPL-MCNC: 1.24 MG/DL — SIGNIFICANT CHANGE UP (ref 0.5–1.3)
GLUCOSE SERPL-MCNC: 88 MG/DL — SIGNIFICANT CHANGE UP (ref 70–99)
HCT VFR BLD CALC: 46 % — SIGNIFICANT CHANGE UP (ref 39–50)
HGB BLD-MCNC: 14.9 G/DL — SIGNIFICANT CHANGE UP (ref 13–17)
MCHC RBC-ENTMCNC: 30.2 PG — SIGNIFICANT CHANGE UP (ref 27–34)
MCHC RBC-ENTMCNC: 32.4 % — SIGNIFICANT CHANGE UP (ref 32–36)
MCV RBC AUTO: 93.1 FL — SIGNIFICANT CHANGE UP (ref 80–100)
NRBC # FLD: 0 K/UL — SIGNIFICANT CHANGE UP (ref 0–0)
PLATELET # BLD AUTO: 226 K/UL — SIGNIFICANT CHANGE UP (ref 150–400)
PMV BLD: 8.8 FL — SIGNIFICANT CHANGE UP (ref 7–13)
POTASSIUM SERPL-MCNC: 3.9 MMOL/L — SIGNIFICANT CHANGE UP (ref 3.5–5.3)
POTASSIUM SERPL-SCNC: 3.9 MMOL/L — SIGNIFICANT CHANGE UP (ref 3.5–5.3)
RBC # BLD: 4.94 M/UL — SIGNIFICANT CHANGE UP (ref 4.2–5.8)
RBC # FLD: 12.9 % — SIGNIFICANT CHANGE UP (ref 10.3–14.5)
SODIUM SERPL-SCNC: 142 MMOL/L — SIGNIFICANT CHANGE UP (ref 135–145)
WBC # BLD: 5.87 K/UL — SIGNIFICANT CHANGE UP (ref 3.8–10.5)
WBC # FLD AUTO: 5.87 K/UL — SIGNIFICANT CHANGE UP (ref 3.8–10.5)

## 2020-09-18 RX ORDER — PANTOPRAZOLE SODIUM 20 MG/1
1 TABLET, DELAYED RELEASE ORAL
Qty: 0 | Refills: 0 | DISCHARGE

## 2020-09-18 RX ORDER — ACETAMINOPHEN 500 MG
2 TABLET ORAL
Qty: 0 | Refills: 0 | DISCHARGE

## 2020-09-18 NOTE — H&P PST ADULT - NSICDXPASTMEDICALHX_GEN_ALL_CORE_FT
PAST MEDICAL HISTORY:  Cyst of left kidney     Kidney stones     Obstructive sleep apnea, adult uses CPAP machine; sleep studies done many yrs ago    Prostate cancer

## 2020-09-18 NOTE — H&P PST ADULT - NSICDXPASTSURGICALHX_GEN_ALL_CORE_FT
PAST SURGICAL HISTORY:  H/O partial nephrectomy 2019    H/O vasectomy     S/P knee surgery left - many yrs ago

## 2020-09-18 NOTE — H&P PST ADULT - HISTORY OF PRESENT ILLNESS
70 yrs old male who had left Laparoscopic Partial Nephrectomy Poss Radical Poss Open with Dr Moody on 6/10/19 - pt had  left kidney cyst on CT scan with w/u for lower back pain. Pt had prostate biopsy that confirmed prostate cancer.  Pt presents for preop eval to have prostate cryoablation fo rprostate cancer.   Pt hx of sleep apnea and uses hearing aids bilaterally - pt insists on wearing hearing aids during surgery.

## 2020-09-18 NOTE — H&P PST ADULT - RS GEN PE MLT RESP DETAILS PC
no rhonchi/no rales/no wheezes/good air movement/clear to auscultation bilaterally/airway patent/respirations non-labored/breath sounds equal

## 2020-09-18 NOTE — H&P PST ADULT - ASSESSMENT
Pt presents for preop eval to have prostate cryoablation for prostate cancer.   Pt hx of sleep apnea and uses hearing aids bilaterally - pt insists on wearing hearing aids during surgery.

## 2020-09-18 NOTE — H&P PST ADULT - NSICDXPROBLEM_GEN_ALL_CORE_FT
PROBLEM DIAGNOSES  Problem: Prostate cancer  Assessment and Plan:       R/O PROBLEM DIAGNOSES  Problem: Sleep apnea, obstructive  Assessment and Plan:      PROBLEM DIAGNOSES  Problem: Prostate cancer  Assessment and Plan: Pt scheduled for prostate cryoablation for prostate cancer.  Labs pending, EKG done by PCP will obtain copy.  Preop instructions provided to pt.  Pt advised to schedule for COVID testing 3 days prior to surgery.      R/O PROBLEM DIAGNOSES  Problem: Sleep apnea, obstructive  Assessment and Plan: Pt uses CPAP machine nightly.  GABY precautions recommended.  OR booking notified via fax

## 2020-09-24 DIAGNOSIS — Z01.818 ENCOUNTER FOR OTHER PREPROCEDURAL EXAMINATION: ICD-10-CM

## 2020-09-26 ENCOUNTER — APPOINTMENT (OUTPATIENT)
Dept: DISASTER EMERGENCY | Facility: CLINIC | Age: 70
End: 2020-09-26

## 2020-09-27 LAB — SARS-COV-2 N GENE NPH QL NAA+PROBE: NOT DETECTED

## 2020-09-29 ENCOUNTER — TRANSCRIPTION ENCOUNTER (OUTPATIENT)
Age: 70
End: 2020-09-29

## 2020-09-30 ENCOUNTER — OUTPATIENT (OUTPATIENT)
Dept: OUTPATIENT SERVICES | Facility: HOSPITAL | Age: 70
LOS: 1 days | Discharge: HOME | End: 2020-09-30
Payer: MEDICARE

## 2020-09-30 ENCOUNTER — APPOINTMENT (OUTPATIENT)
Dept: UROLOGY | Facility: AMBULATORY SURGERY CENTER | Age: 70
End: 2020-09-30

## 2020-09-30 VITALS
TEMPERATURE: 98 F | OXYGEN SATURATION: 97 % | SYSTOLIC BLOOD PRESSURE: 142 MMHG | HEART RATE: 61 BPM | DIASTOLIC BLOOD PRESSURE: 63 MMHG

## 2020-09-30 VITALS
WEIGHT: 225.97 LBS | SYSTOLIC BLOOD PRESSURE: 145 MMHG | TEMPERATURE: 98 F | DIASTOLIC BLOOD PRESSURE: 76 MMHG | OXYGEN SATURATION: 99 % | RESPIRATION RATE: 16 BRPM | HEIGHT: 69.5 IN | HEART RATE: 60 BPM

## 2020-09-30 DIAGNOSIS — Z90.5 ACQUIRED ABSENCE OF KIDNEY: Chronic | ICD-10-CM

## 2020-09-30 DIAGNOSIS — Z98.52 VASECTOMY STATUS: Chronic | ICD-10-CM

## 2020-09-30 DIAGNOSIS — Z98.890 OTHER SPECIFIED POSTPROCEDURAL STATES: Chronic | ICD-10-CM

## 2020-09-30 DIAGNOSIS — C61 MALIGNANT NEOPLASM OF PROSTATE: ICD-10-CM

## 2020-09-30 PROCEDURE — ZZZZZ: CPT

## 2020-09-30 PROCEDURE — 55873 CRYOABLATE PROSTATE: CPT

## 2020-09-30 RX ORDER — SODIUM CHLORIDE 9 MG/ML
1000 INJECTION, SOLUTION INTRAVENOUS
Refills: 0 | Status: DISCONTINUED | OUTPATIENT
Start: 2020-09-30 | End: 2020-10-14

## 2020-09-30 NOTE — ASU DISCHARGE PLAN (ADULT/PEDIATRIC) - CARE PROVIDER_API CALL
Jason García  UROLOGY  [address] [place] [address], [place] [id number]  [place] [id number]  Phone: [phone]  Fax: [phone]  Follow Up Time:

## 2020-09-30 NOTE — ASU DISCHARGE PLAN (ADULT/PEDIATRIC) - CALL YOUR DOCTOR IF YOU HAVE ANY OF THE FOLLOWING:
Nausea and vomiting that does not stop/Unable to urinate/Fever greater than (need to indicate Fahrenheit or Celsius)/Bleeding that does not stop Fever greater than (need to indicate Fahrenheit or Celsius)/Nausea and vomiting that does not stop/Unable to urinate/Bleeding that does not stop/Pain not relieved by Medications

## 2020-09-30 NOTE — ASU DISCHARGE PLAN (ADULT/PEDIATRIC) - NURSING INSTRUCTIONS
DO NOT take any Tylenol (Acetaminophen) or narcotics containing Tylenol until after 06:40pm. You received Tylenol during your operation and it can cause damage to your liver if too much is taken within a 24 hour time period. DO NOT take any Tylenol (Acetaminophen) or narcotics containing Tylenol until after 06:41pm. You received Tylenol during your operation and it can cause damage to your liver if too much is taken within a 24 hour time period.

## 2020-09-30 NOTE — ASU DISCHARGE PLAN (ADULT/PEDIATRIC) - ASU DC SPECIAL INSTRUCTIONSFT
Follow up in the office tomorrow for catheter removal. Patient to remove ridley himself on Friday morning

## 2020-10-01 ENCOUNTER — EMERGENCY (EMERGENCY)
Facility: HOSPITAL | Age: 70
LOS: 1 days | Discharge: HOME | End: 2020-10-01
Attending: EMERGENCY MEDICINE | Admitting: EMERGENCY MEDICINE
Payer: MEDICARE

## 2020-10-01 VITALS
HEART RATE: 71 BPM | SYSTOLIC BLOOD PRESSURE: 131 MMHG | RESPIRATION RATE: 16 BRPM | OXYGEN SATURATION: 98 % | DIASTOLIC BLOOD PRESSURE: 84 MMHG

## 2020-10-01 VITALS
OXYGEN SATURATION: 97 % | DIASTOLIC BLOOD PRESSURE: 90 MMHG | RESPIRATION RATE: 16 BRPM | TEMPERATURE: 98 F | HEIGHT: 69.5 IN | HEART RATE: 74 BPM | SYSTOLIC BLOOD PRESSURE: 130 MMHG

## 2020-10-01 DIAGNOSIS — Z98.52 VASECTOMY STATUS: Chronic | ICD-10-CM

## 2020-10-01 DIAGNOSIS — Z98.890 OTHER SPECIFIED POSTPROCEDURAL STATES: Chronic | ICD-10-CM

## 2020-10-01 DIAGNOSIS — Z90.5 ACQUIRED ABSENCE OF KIDNEY: Chronic | ICD-10-CM

## 2020-10-01 PROBLEM — C61 MALIGNANT NEOPLASM OF PROSTATE: Chronic | Status: ACTIVE | Noted: 2020-09-18

## 2020-10-01 PROCEDURE — 99283 EMERGENCY DEPT VISIT LOW MDM: CPT | Mod: 25

## 2020-10-01 PROCEDURE — 51702 INSERT TEMP BLADDER CATH: CPT

## 2020-10-01 NOTE — ED PROVIDER NOTE - PMH
Cyst of left kidney    Kidney stones    Obstructive sleep apnea, adult  uses CPAP machine; sleep studies done many yrs ago  Prostate cancer

## 2020-10-01 NOTE — ED PROVIDER NOTE - PATIENT PORTAL LINK FT
You can access the FollowMyHealth Patient Portal offered by [facility] by registering at the following website: [email or web address]. By joining [employer]’s FollowMyHealth portal, you will also be able to view your health information using other applications (apps) compatible with our system.

## 2020-10-01 NOTE — ED PROVIDER NOTE - CLINICAL SUMMARY MEDICAL DECISION MAKING FREE TEXT BOX
Urinary retention one day post op prostate surgery requiring evaluation and Wilburn catheter placement.

## 2020-10-01 NOTE — ED ADULT NURSE NOTE - OBJECTIVE STATEMENT
pt with urinary retention starting today following the removal of his ridley catheter. Pt had prostate procedure done yesterday and had ridley placed.

## 2020-10-01 NOTE — ED PROVIDER NOTE - OBJECTIVE STATEMENT
71 yo white male, had prostate surgery yesterday and was discharged home with Wilburn in place, catheter was removed earlier today but then has been unable to urinate since 1300 today. No fever or chills. No nausea or vomiting.

## 2020-10-01 NOTE — ED ADULT NURSE REASSESSMENT NOTE - NS ED NURSE REASSESS COMMENT FT1
leg bag connected to ridley catheter for discharge. pt education regarding usage provided. Pt demonstrates understanding.

## 2020-10-01 NOTE — ED PROVIDER NOTE - CONSTITUTIONAL, MLM
normal... Well appearing, male, awake, alert, oriented to person, place, time/situation and in mild apparent distress.

## 2020-10-01 NOTE — ED PROVIDER NOTE - CARE PROVIDER_API CALL
Angle Moody  UROLOGY  [address] [place] [address], [place] [address]  Phone: [phone]  Fax: [phone]  Follow Up Time:

## 2020-10-01 NOTE — ED ADULT TRIAGE NOTE - CHIEF COMPLAINT QUOTE
unable to urinate,had porstate surgery yesterday and ridley taken out this am and since then has difficulty urinating

## 2020-10-31 RX ORDER — SILDENAFIL 20 MG/1
20 TABLET ORAL
Qty: 90 | Refills: 0 | Status: ACTIVE | COMMUNITY
Start: 2020-10-31 | End: 1900-01-01

## 2020-12-29 ENCOUNTER — OUTPATIENT (OUTPATIENT)
Dept: OUTPATIENT SERVICES | Facility: HOSPITAL | Age: 70
LOS: 1 days | End: 2020-12-29
Payer: MEDICARE

## 2020-12-29 ENCOUNTER — APPOINTMENT (OUTPATIENT)
Dept: UROLOGY | Facility: CLINIC | Age: 70
End: 2020-12-29
Payer: MEDICARE

## 2020-12-29 VITALS
HEIGHT: 69 IN | WEIGHT: 218 LBS | HEART RATE: 56 BPM | RESPIRATION RATE: 17 BRPM | BODY MASS INDEX: 32.29 KG/M2 | DIASTOLIC BLOOD PRESSURE: 74 MMHG | SYSTOLIC BLOOD PRESSURE: 140 MMHG | TEMPERATURE: 97.8 F

## 2020-12-29 DIAGNOSIS — C64.9 MALIGNANT NEOPLASM OF UNSPECIFIED KIDNEY, EXCEPT RENAL PELVIS: ICD-10-CM

## 2020-12-29 DIAGNOSIS — R35.0 FREQUENCY OF MICTURITION: ICD-10-CM

## 2020-12-29 DIAGNOSIS — Z98.52 VASECTOMY STATUS: Chronic | ICD-10-CM

## 2020-12-29 DIAGNOSIS — Z90.5 ACQUIRED ABSENCE OF KIDNEY: Chronic | ICD-10-CM

## 2020-12-29 DIAGNOSIS — Z98.890 OTHER SPECIFIED POSTPROCEDURAL STATES: Chronic | ICD-10-CM

## 2020-12-29 PROCEDURE — 99214 OFFICE O/P EST MOD 30 MIN: CPT | Mod: 25

## 2020-12-29 PROCEDURE — 76775 US EXAM ABDO BACK WALL LIM: CPT

## 2020-12-29 PROCEDURE — 76775 US EXAM ABDO BACK WALL LIM: CPT | Mod: 26

## 2020-12-29 NOTE — ASSESSMENT
[FreeTextEntry1] : He is doing well . He complains of left side pain at times . Recommend work up by PCP for possible GB . In regards to his RCC , there is no recurrence on the ultrasound . In regards to his CA p He is continent and can have erections with Cialis . He will see Dr Emanuel soon . He will send me his PSA

## 2020-12-29 NOTE — HISTORY OF PRESENT ILLNESS
[FreeTextEntry1] : Left partial nephrectomy for RCC cancer. He will get a PSA and fax to me \par  [Urinary Incontinence] : urinary incontinence

## 2020-12-31 DIAGNOSIS — C64.9 MALIGNANT NEOPLASM OF UNSPECIFIED KIDNEY, EXCEPT RENAL PELVIS: ICD-10-CM

## 2020-12-31 DIAGNOSIS — C61 MALIGNANT NEOPLASM OF PROSTATE: ICD-10-CM

## 2020-12-31 DIAGNOSIS — R97.20 ELEVATED PROSTATE SPECIFIC ANTIGEN [PSA]: ICD-10-CM

## 2021-01-12 ENCOUNTER — APPOINTMENT (OUTPATIENT)
Dept: SURGERY | Facility: CLINIC | Age: 71
End: 2021-01-12
Payer: MEDICARE

## 2021-01-12 VITALS
SYSTOLIC BLOOD PRESSURE: 120 MMHG | BODY MASS INDEX: 33.04 KG/M2 | WEIGHT: 223.1 LBS | DIASTOLIC BLOOD PRESSURE: 80 MMHG | TEMPERATURE: 97.2 F | OXYGEN SATURATION: 97 % | HEIGHT: 69 IN | HEART RATE: 64 BPM

## 2021-01-12 PROCEDURE — 99205 OFFICE O/P NEW HI 60 MIN: CPT

## 2021-01-14 DIAGNOSIS — K21.9 GASTRO-ESOPHAGEAL REFLUX DISEASE W/OUT ESOPHAGITIS: ICD-10-CM

## 2021-01-14 NOTE — DATA REVIEWED
[FreeTextEntry1] : CT\par MRI\par HIDA CCK of [id number] reports and images reviewed- studies done through outside radiology, ELLIPChace
No

## 2021-01-14 NOTE — HISTORY OF PRESENT ILLNESS
[de-identified] : Very pleasant gentleman with ~ 2 year history of intermittent and low grade but persistent RUQ, Right Flank and Right Back pain.\par His sense is that it is not clearly related to food.\par However, given the location, anatomic imaging studies were done as CT/ MRI which showed gallstones.\par A subsequent HIDA CCK was done this past fall which showed no visualization of his gallbladder.\par He presents today for consultation regarding possible cholecystectomy.

## 2021-01-14 NOTE — PHYSICAL EXAM
[Respiratory Effort] : normal respiratory effort [Normal Rate and Rhythm] : normal rate and rhythm [No HSM] : no hepatosplenomegaly [No Rash or Lesion] : No rash or lesion [Alert] : alert [Oriented to Person] : oriented to person [Oriented to Place] : oriented to place [Oriented to Time] : oriented to time [Anxious] : anxious [Tender] : was nontender [Enlarged] : not enlarged [de-identified] : Appears well, no acute distress, ambulates easily into office and assumes examination table without need of assistance. [de-identified] : Normocephalic and atraumatic. [de-identified] : Supple with full range of motion. [FreeTextEntry1] : No cervical, supraclavicular, axillary or inguinal adenopathy. [de-identified] : No visible lesions or palpable masses. [de-identified] : RUQ free of visible mass.\par Epigastrium free of palpable mass.\par ? MINIMAL tenderness to DEEPEST palpation of RUQ without guarding, rebound or referred pain.\par Well-healed incisions consistent with known partial left nephrectomy. [de-identified] : Normal external genitalia. [de-identified] : Deferred. [de-identified] : Grossly symmetric and within normal limits without any obvious motor or sensory deficits. [de-identified] : though appropriately so...

## 2021-01-14 NOTE — PLAN
[FreeTextEntry1] : History, physical examination, the available imaging concerning for symptomatic cholelithiasis with likely a component of more  chronic cholecystitis.  Given the associated discomfort/ pain, recommended limitations in lifestyle, the presence of a radiographically abnormal gallbladder, the patient’s understandable anxiety and the risk of progression to barbara acute cholecystitis, at least consideration of operative intervention does seem indicated and appropriate.\par \par The risks, benefits and nature of the operative intervention have been reviewed at length, including but not limited to the possibility of conversion from laparoscopy to open surgery, the possibility of intra-operative imaging or the performance of a cholangiogram, the possibility of post-operative intra-abdominal drains, the possibility of biliary tract, visceral or vascular injury, the possibility of residual abdominal wall deformity, the possibility of infection or abscess or post-operative collection, and the approximate size, number and location of the typical or expected operative incisions.  We have discussed that subtotal or partial cholecystectomy, cholecystostomy with drainage or a discontinued diagnostic laparoscopy is also possible pending the intra-operative findings.  We have reviewed that further abdominal findings or incidental pathology may be noted during laparoscopy.  We have talked about that all abdominal surgery maintains the possibilities of future abdominal wall herniation, future intra-abdominal adhesions with the risk of bowel obstruction and increased risk during future operative interventions.  We have detailed that medical complications unrelated to the specific operative procedure such as cardiopulmonary issues, deep venous thrombosis with or without pulmonary embolism and urinary retention are possible.  We have specifically reviewed the possibility of PostCholecystectomy Syndrome with residual abdominal pain or postprandial gastrointestinal complaints. I have told him that this is a process which may be amenable to conservative management or may ultimately require further future gastroenterology evaluation.  We have discussed that PostCholecystectomy Syndrome complaints may require long term dietary modifications with or without supplemental prescription medications.  We have reviewed that general anesthesia with intubation will be required and that pending post-operative progress, ambulatory care may be appropriate or a transition to inpatient care may be required.\par \par We have discussed that a component of his discomfort MAY be musculoskeletal given the chronic nature of his complaints.  However, in the setting of known RUQ/ flank/ upper back pain, gallstones and a HIDA with no visualization, I do think that cholecystectomy is indicated both therapeutically and very likely prophylactically.\par \par He is pleased and will consider/ discuss with his daughter who is a physician.\par \par Despite all of his prior testing, he has never had a dedicated RUQ ultrasound.  I will send him for this in hopes of further delineating the anatomy and ruling out a CBD process.\par

## 2021-01-14 NOTE — REVIEW OF SYSTEMS
[As Noted in HPI] : as noted in HPI [Anxiety] : anxiety [Negative] : Heme/Lymph [Feeling Poorly] : not feeling poorly [Feeling Tired] : not feeling tired [Depression] : no depression [de-identified] : regarding this issue and visit...

## 2021-01-20 ENCOUNTER — NON-APPOINTMENT (OUTPATIENT)
Age: 71
End: 2021-01-20

## 2021-01-28 ENCOUNTER — OUTPATIENT (OUTPATIENT)
Dept: OUTPATIENT SERVICES | Facility: HOSPITAL | Age: 71
LOS: 1 days | End: 2021-01-28
Payer: MEDICARE

## 2021-01-28 VITALS
SYSTOLIC BLOOD PRESSURE: 150 MMHG | TEMPERATURE: 98 F | OXYGEN SATURATION: 98 % | DIASTOLIC BLOOD PRESSURE: 50 MMHG | HEIGHT: 69 IN | RESPIRATION RATE: 16 BRPM | HEART RATE: 58 BPM | WEIGHT: 220.02 LBS

## 2021-01-28 DIAGNOSIS — K80.10 CALCULUS OF GALLBLADDER WITH CHRONIC CHOLECYSTITIS WITHOUT OBSTRUCTION: ICD-10-CM

## 2021-01-28 DIAGNOSIS — K80.20 CALCULUS OF GALLBLADDER WITHOUT CHOLECYSTITIS WITHOUT OBSTRUCTION: ICD-10-CM

## 2021-01-28 DIAGNOSIS — Z01.818 ENCOUNTER FOR OTHER PREPROCEDURAL EXAMINATION: ICD-10-CM

## 2021-01-28 DIAGNOSIS — Z98.890 OTHER SPECIFIED POSTPROCEDURAL STATES: Chronic | ICD-10-CM

## 2021-01-28 DIAGNOSIS — Z90.5 ACQUIRED ABSENCE OF KIDNEY: Chronic | ICD-10-CM

## 2021-01-28 DIAGNOSIS — Z98.52 VASECTOMY STATUS: Chronic | ICD-10-CM

## 2021-01-28 LAB
ALBUMIN SERPL ELPH-MCNC: 3.6 G/DL — SIGNIFICANT CHANGE UP (ref 3.3–5)
ALP SERPL-CCNC: 81 U/L — SIGNIFICANT CHANGE UP (ref 40–120)
ALT FLD-CCNC: 42 U/L — SIGNIFICANT CHANGE UP (ref 12–78)
ANION GAP SERPL CALC-SCNC: 5 MMOL/L — SIGNIFICANT CHANGE UP (ref 5–17)
AST SERPL-CCNC: 13 U/L — LOW (ref 15–37)
BILIRUB SERPL-MCNC: 1.5 MG/DL — HIGH (ref 0.2–1.2)
BUN SERPL-MCNC: 15 MG/DL — SIGNIFICANT CHANGE UP (ref 7–23)
CALCIUM SERPL-MCNC: 9.3 MG/DL — SIGNIFICANT CHANGE UP (ref 8.5–10.1)
CHLORIDE SERPL-SCNC: 107 MMOL/L — SIGNIFICANT CHANGE UP (ref 96–108)
CO2 SERPL-SCNC: 31 MMOL/L — SIGNIFICANT CHANGE UP (ref 22–31)
CREAT SERPL-MCNC: 1.2 MG/DL — SIGNIFICANT CHANGE UP (ref 0.5–1.3)
GLUCOSE SERPL-MCNC: 95 MG/DL — SIGNIFICANT CHANGE UP (ref 70–99)
HCT VFR BLD CALC: 47.3 % — SIGNIFICANT CHANGE UP (ref 39–50)
HGB BLD-MCNC: 15.7 G/DL — SIGNIFICANT CHANGE UP (ref 13–17)
MCHC RBC-ENTMCNC: 30 PG — SIGNIFICANT CHANGE UP (ref 27–34)
MCHC RBC-ENTMCNC: 33.2 GM/DL — SIGNIFICANT CHANGE UP (ref 32–36)
MCV RBC AUTO: 90.4 FL — SIGNIFICANT CHANGE UP (ref 80–100)
NRBC # BLD: 0 /100 WBCS — SIGNIFICANT CHANGE UP (ref 0–0)
PLATELET # BLD AUTO: 243 K/UL — SIGNIFICANT CHANGE UP (ref 150–400)
POTASSIUM SERPL-MCNC: 4.3 MMOL/L — SIGNIFICANT CHANGE UP (ref 3.5–5.3)
POTASSIUM SERPL-SCNC: 4.3 MMOL/L — SIGNIFICANT CHANGE UP (ref 3.5–5.3)
PROT SERPL-MCNC: 7.4 G/DL — SIGNIFICANT CHANGE UP (ref 6–8.3)
RBC # BLD: 5.23 M/UL — SIGNIFICANT CHANGE UP (ref 4.2–5.8)
RBC # FLD: 12.9 % — SIGNIFICANT CHANGE UP (ref 10.3–14.5)
SODIUM SERPL-SCNC: 143 MMOL/L — SIGNIFICANT CHANGE UP (ref 135–145)
WBC # BLD: 6.91 K/UL — SIGNIFICANT CHANGE UP (ref 3.8–10.5)
WBC # FLD AUTO: 6.91 K/UL — SIGNIFICANT CHANGE UP (ref 3.8–10.5)

## 2021-01-28 PROCEDURE — 93005 ELECTROCARDIOGRAM TRACING: CPT

## 2021-01-28 PROCEDURE — 93010 ELECTROCARDIOGRAM REPORT: CPT

## 2021-01-28 PROCEDURE — 85027 COMPLETE CBC AUTOMATED: CPT

## 2021-01-28 PROCEDURE — G0463: CPT

## 2021-01-28 PROCEDURE — 86900 BLOOD TYPING SEROLOGIC ABO: CPT

## 2021-01-28 PROCEDURE — 80053 COMPREHEN METABOLIC PANEL: CPT

## 2021-01-28 PROCEDURE — 86901 BLOOD TYPING SEROLOGIC RH(D): CPT

## 2021-01-28 PROCEDURE — 36415 COLL VENOUS BLD VENIPUNCTURE: CPT

## 2021-01-28 PROCEDURE — 86850 RBC ANTIBODY SCREEN: CPT

## 2021-01-28 RX ORDER — PANTOPRAZOLE SODIUM 20 MG/1
0.5 TABLET, DELAYED RELEASE ORAL
Qty: 0 | Refills: 0 | DISCHARGE

## 2021-01-28 NOTE — H&P PST ADULT - NSICDXPROBLEM_GEN_ALL_CORE_FT
PROBLEM DIAGNOSES  Problem: Gall stones  Assessment and Plan: laparoscopic cholecystectomy.  Medical eval advised.  Pre op instructions:  Hold OTC supplements. Medications reviewed for the week and morning of surgery.  NPO after 11pm to the morning of surgery.  Shower 2 days before and the morning of surgery with antibacterial soap as instructed.  Covid testing information given.  Patient verbalized understanding.

## 2021-01-28 NOTE — H&P PST ADULT - HISTORY OF PRESENT ILLNESS
70 y/o male known to have gall stones for almost 2 yrs. Started with right sided back pain, had MRI done found to have right kidney cancer. had the tumor removed, then went for check up and had prostate cancer. Followup with the doctor, had another sonogram of abdomen and pelvis, diagnosed with gal stones. Scheduled for laparoscopic cholecystectomy. Pre op testing today.

## 2021-01-28 NOTE — H&P PST ADULT - NSICDXPASTMEDICALHX_GEN_ALL_CORE_FT
PAST MEDICAL HISTORY:  Cancer of kidney right kidney    Cyst of left kidney     Kidney stones     Obstructive sleep apnea, adult uses CPAP machine; sleep studies done many yrs ago    Prostate cancer

## 2021-02-01 ENCOUNTER — OUTPATIENT (OUTPATIENT)
Dept: OUTPATIENT SERVICES | Facility: HOSPITAL | Age: 71
LOS: 1 days | End: 2021-02-01
Payer: MEDICARE

## 2021-02-01 DIAGNOSIS — Z98.52 VASECTOMY STATUS: Chronic | ICD-10-CM

## 2021-02-01 DIAGNOSIS — Z20.828 CONTACT WITH AND (SUSPECTED) EXPOSURE TO OTHER VIRAL COMMUNICABLE DISEASES: ICD-10-CM

## 2021-02-01 DIAGNOSIS — Z90.5 ACQUIRED ABSENCE OF KIDNEY: Chronic | ICD-10-CM

## 2021-02-01 DIAGNOSIS — Z98.890 OTHER SPECIFIED POSTPROCEDURAL STATES: Chronic | ICD-10-CM

## 2021-02-01 PROBLEM — C64.9 MALIGNANT NEOPLASM OF UNSPECIFIED KIDNEY, EXCEPT RENAL PELVIS: Chronic | Status: ACTIVE | Noted: 2021-01-28

## 2021-02-01 PROCEDURE — U0003: CPT

## 2021-02-01 PROCEDURE — U0005: CPT

## 2021-02-02 LAB — SARS-COV-2 RNA SPEC QL NAA+PROBE: SIGNIFICANT CHANGE UP

## 2021-02-03 ENCOUNTER — TRANSCRIPTION ENCOUNTER (OUTPATIENT)
Age: 71
End: 2021-02-03

## 2021-02-03 NOTE — ASU PATIENT PROFILE, ADULT - PMH
Cancer of kidney  right kidney  Cyst of left kidney    Kidney stones    Obstructive sleep apnea, adult  uses CPAP machine; sleep studies done many yrs ago  Prostate cancer

## 2021-02-04 ENCOUNTER — APPOINTMENT (OUTPATIENT)
Dept: SURGERY | Facility: HOSPITAL | Age: 71
End: 2021-02-04

## 2021-02-04 ENCOUNTER — OUTPATIENT (OUTPATIENT)
Dept: OUTPATIENT SERVICES | Facility: HOSPITAL | Age: 71
LOS: 1 days | End: 2021-02-04
Payer: MEDICARE

## 2021-02-04 ENCOUNTER — RESULT REVIEW (OUTPATIENT)
Age: 71
End: 2021-02-04

## 2021-02-04 VITALS
RESPIRATION RATE: 16 BRPM | DIASTOLIC BLOOD PRESSURE: 69 MMHG | SYSTOLIC BLOOD PRESSURE: 107 MMHG | WEIGHT: 220.02 LBS | HEIGHT: 69 IN | OXYGEN SATURATION: 96 % | TEMPERATURE: 98 F | HEART RATE: 54 BPM

## 2021-02-04 VITALS
DIASTOLIC BLOOD PRESSURE: 55 MMHG | HEART RATE: 59 BPM | SYSTOLIC BLOOD PRESSURE: 122 MMHG | TEMPERATURE: 98 F | OXYGEN SATURATION: 97 % | RESPIRATION RATE: 15 BRPM

## 2021-02-04 DIAGNOSIS — K80.10 CALCULUS OF GALLBLADDER WITH CHRONIC CHOLECYSTITIS WITHOUT OBSTRUCTION: ICD-10-CM

## 2021-02-04 DIAGNOSIS — Z98.890 OTHER SPECIFIED POSTPROCEDURAL STATES: Chronic | ICD-10-CM

## 2021-02-04 DIAGNOSIS — Z90.5 ACQUIRED ABSENCE OF KIDNEY: Chronic | ICD-10-CM

## 2021-02-04 DIAGNOSIS — Z01.818 ENCOUNTER FOR OTHER PREPROCEDURAL EXAMINATION: ICD-10-CM

## 2021-02-04 DIAGNOSIS — Z98.52 VASECTOMY STATUS: Chronic | ICD-10-CM

## 2021-02-04 PROCEDURE — 47562 LAPAROSCOPIC CHOLECYSTECTOMY: CPT

## 2021-02-04 PROCEDURE — 47562 LAPAROSCOPIC CHOLECYSTECTOMY: CPT | Mod: AS

## 2021-02-04 PROCEDURE — 88304 TISSUE EXAM BY PATHOLOGIST: CPT | Mod: 26

## 2021-02-04 PROCEDURE — C1889: CPT

## 2021-02-04 PROCEDURE — 88304 TISSUE EXAM BY PATHOLOGIST: CPT

## 2021-02-04 RX ORDER — ONDANSETRON 8 MG/1
4 TABLET, FILM COATED ORAL ONCE
Refills: 0 | Status: DISCONTINUED | OUTPATIENT
Start: 2021-02-04 | End: 2021-02-04

## 2021-02-04 RX ORDER — OXYCODONE HYDROCHLORIDE 5 MG/1
1 TABLET ORAL
Qty: 30 | Refills: 0
Start: 2021-02-04

## 2021-02-04 RX ORDER — SODIUM CHLORIDE 9 MG/ML
1000 INJECTION, SOLUTION INTRAVENOUS
Refills: 0 | Status: DISCONTINUED | OUTPATIENT
Start: 2021-02-04 | End: 2021-02-04

## 2021-02-04 RX ORDER — HYDROMORPHONE HYDROCHLORIDE 2 MG/ML
0.5 INJECTION INTRAMUSCULAR; INTRAVENOUS; SUBCUTANEOUS
Refills: 0 | Status: DISCONTINUED | OUTPATIENT
Start: 2021-02-04 | End: 2021-02-04

## 2021-02-04 RX ORDER — PANTOPRAZOLE SODIUM 20 MG/1
1 TABLET, DELAYED RELEASE ORAL
Qty: 0 | Refills: 0 | DISCHARGE

## 2021-02-04 RX ORDER — IBUPROFEN 200 MG
1 TABLET ORAL
Qty: 0 | Refills: 0 | DISCHARGE

## 2021-02-04 RX ORDER — CEFAZOLIN SODIUM 1 G
2000 VIAL (EA) INJECTION ONCE
Refills: 0 | Status: COMPLETED | OUTPATIENT
Start: 2021-02-04 | End: 2021-02-04

## 2021-02-04 RX ORDER — CHOLECALCIFEROL (VITAMIN D3) 125 MCG
1 CAPSULE ORAL
Qty: 0 | Refills: 0 | DISCHARGE

## 2021-02-04 RX ORDER — OMEGA-3 ACID ETHYL ESTERS 1 G
1 CAPSULE ORAL
Qty: 0 | Refills: 0 | DISCHARGE

## 2021-02-04 RX ORDER — MILK THISTLE 180 MG
1 CAPSULE ORAL
Qty: 0 | Refills: 0 | DISCHARGE

## 2021-02-04 RX ORDER — OXYCODONE HYDROCHLORIDE 5 MG/1
5 TABLET ORAL ONCE
Refills: 0 | Status: DISCONTINUED | OUTPATIENT
Start: 2021-02-04 | End: 2021-02-04

## 2021-02-04 NOTE — ASU DISCHARGE PLAN (ADULT/PEDIATRIC) - ASU DC SPECIAL INSTRUCTIONSFT
REST!  Relax!  Ice packs to Op sites for 30 minutes on and then 30 minutes off every hour while awake for next 48 hours.  May take Tylenol for minor pain.  Please minimize any Aspirin, Advil or Motrin next 48 hours.  A script for pain medication has been forwarded to your pharmacy.  Please take an over the counter stool softener like COLACE twice daily to prevent constipation.  CALL for ANY questions or concerns.  To call office at your convenience to confirm post-operative visit in ~ 2 weeks.

## 2021-02-04 NOTE — BRIEF OPERATIVE NOTE - NSICDXBRIEFPROCEDURE_GEN_ALL_CORE_FT
PROCEDURES:  Cholecystectomy, laparoscopic, age older than 10 years 04-Feb-2021 10:30:25  Ezra Ortega J

## 2021-02-04 NOTE — ASU DISCHARGE PLAN (ADULT/PEDIATRIC) - CARE PROVIDER_API CALL
Ezra Ortega)  Surgery  [address], [place] [id number]  Phone: [phone]  Fax: [phone]  Follow Up Time: 2 weeks

## 2021-02-05 LAB — SURGICAL PATHOLOGY STUDY: SIGNIFICANT CHANGE UP

## 2021-02-16 ENCOUNTER — APPOINTMENT (OUTPATIENT)
Dept: SURGERY | Facility: CLINIC | Age: 71
End: 2021-02-16
Payer: MEDICARE

## 2021-02-16 VITALS
BODY MASS INDEX: 33.03 KG/M2 | HEIGHT: 69 IN | SYSTOLIC BLOOD PRESSURE: 130 MMHG | TEMPERATURE: 97.3 F | HEART RATE: 71 BPM | WEIGHT: 223 LBS | DIASTOLIC BLOOD PRESSURE: 90 MMHG | OXYGEN SATURATION: 96 %

## 2021-02-16 DIAGNOSIS — R10.11 RIGHT UPPER QUADRANT PAIN: ICD-10-CM

## 2021-02-16 DIAGNOSIS — Z87.19 PERSONAL HISTORY OF OTHER DISEASES OF THE DIGESTIVE SYSTEM: ICD-10-CM

## 2021-02-16 DIAGNOSIS — R94.8 ABNORMAL RESULTS OF FUNCTION STUDIES OF OTHER ORGANS AND SYSTEMS: ICD-10-CM

## 2021-02-16 DIAGNOSIS — Z90.49 ACQUIRED ABSENCE OF OTHER SPECIFIED PARTS OF DIGESTIVE TRACT: ICD-10-CM

## 2021-02-16 PROCEDURE — 99024 POSTOP FOLLOW-UP VISIT: CPT

## 2021-02-16 NOTE — REVIEW OF SYSTEMS
[As Noted in HPI] : as noted in HPI [Feeling Poorly] : not feeling poorly [Feeling Tired] : feeling tired [Depression] : no depression [Negative] : Psychiatric

## 2021-02-16 NOTE — DATA REVIEWED
[FreeTextEntry1] : KEE MURRAY                   2\par Surgical Final Report\par Final Diagnosis\par Gallbladder, laparoscopic cholecystectomy:\par - Chronic cholecystitis.\par - Cholelithiasis.\par Verified by: Katharine Prakash M.D.  (Electronic Signature)\par Reported on: 02/05/21 13:52 EST, [facility], [address] [place] [address]\par Phone: [phone]   Fax: [phone]\par _________________________________________________________________

## 2021-02-16 NOTE — PLAN
[FreeTextEntry1] : S/P uneventful general anesthesia with uncomplicated laparoscopic cholecystectomy.\par Clinically well by this history and surgically stable by this examination.\par No evidence by history or examination to suggest complication.\par Cleared to resume casual activities with own comfort as guide.\par To refrain from maximal exertions for another month.\par Timing and technique of scar massage reviewed in detail.\par Pathology report and it's benign nature consistent with expectations and operative findings reviewed in detail.\par Encouraged to call with questions or concerns.  \par Otherwise, RTO in ~ 6 months to reassess his concern for inguinal hernia, though history non-specific and today's exam more suggestive of laxity than barbara hernia or defect throughout inguinal/ femoral/ Spigelian spaces.\par Mr. Glynn is pleased and agrees, leaving in good spirits and verbalizing plan with instructions as above.\par

## 2021-02-16 NOTE — PHYSICAL EXAM
[Respiratory Effort] : normal respiratory effort [Normal Rate and Rhythm] : normal rate and rhythm [No HSM] : no hepatosplenomegaly [No Rash or Lesion] : No rash or lesion [Alert] : alert [Oriented to Person] : oriented to person [Oriented to Place] : oriented to place [Oriented to Time] : oriented to time [Tender] : was nontender [Enlarged] : not enlarged [Calm] : calm [de-identified] : Appears well and in no acute distress, ambulates easily into office. [de-identified] : Remains normocephalic and atraumatic. [de-identified] : Still supple with full range of motion. [FreeTextEntry1] : No cervical, supraclavicular, axillary or inguinal adenopathy today [de-identified] : There are no visible lesions or palpable masses. [de-identified] : RUQ free of visible mass.\par Epigastrium free of palpable mass.\par Entire abdomen free of tenderness.\par Incisions free of odor or drainage.\par No SQ collections or fascial defects.\par Bilateral groins intact on cough and Valsalva though SLIGHTEST symmetric laxity appreciated with maximal Valsalva in upright position. [de-identified] : Normal external genitalia. [de-identified] : Deferred. [de-identified] : Grossly symmetric, within normal limits without obvious motor or sensory deficits.

## 2021-02-16 NOTE — HISTORY OF PRESENT ILLNESS
[de-identified] : Very pleasant gentleman with ~ 2 year history of intermittent and low grade but persistent RUQ, Right Flank and Right Back pain.\par His sense is that it is not clearly related to food.\par However, given the location, anatomic imaging studies were done as CT/ MRI which showed gallstones.\par A subsequent HIDA CCK was done this past fall which showed no visualization of his gallbladder.\par He presents today for consultation regarding possible cholecystectomy. [de-identified] : Mr. Glynn arrives today in excellent spirits.\par He is now s/p his uneventful anesthesia and uncomplicated procedure.\par He admits to minimal incisional pain and denies any abdominal pain.\par He has had no post-operative GI or  complaints.\par Mr. Glynn is pleased with the appearance of his operative incisions and the overall contour of the abdominal wall...

## 2021-05-21 ENCOUNTER — APPOINTMENT (OUTPATIENT)
Dept: UROLOGY | Facility: CLINIC | Age: 71
End: 2021-05-21

## 2021-06-07 ENCOUNTER — APPOINTMENT (OUTPATIENT)
Dept: UROLOGY | Facility: CLINIC | Age: 71
End: 2021-06-07
Payer: MEDICARE

## 2021-06-07 DIAGNOSIS — N39.41 URGE INCONTINENCE: ICD-10-CM

## 2021-06-07 PROCEDURE — 99213 OFFICE O/P EST LOW 20 MIN: CPT | Mod: 95

## 2021-06-07 RX ORDER — SOLIFENACIN SUCCINATE 5 MG/1
5 TABLET ORAL DAILY
Qty: 90 | Refills: 3 | Status: ACTIVE | COMMUNITY
Start: 2021-06-07 | End: 1900-01-01

## 2021-06-07 NOTE — ASSESSMENT
[FreeTextEntry1] : 70 year old  male with a history of renal cell carcinoma treated by Dr. Moody (recurrence free) - the patient was found to have a positive NAYE that trigger an evaluation - exomedx high risk (47.9% GG2 or >) prostate cancer.  The patient has two malignancies and exposure at [facility] 2001.\par \par Prostate Cancer\par 1) GG1, right anterior base, involving 70%, measuring 9 mm 2) GG1 right posterior apex, involving 25%, measuring 3 mm 3) GG2, left apex PZa (5% pattern 4), involving 5%, measuring 1 mm.  The RAB and Left [place] PZa were both visible on MRI\par \par s/p focal therapy 9/30/21 - two anterior areas treated.  PSA decreased to 0.7 ng/ml\par he is reporting urge associated incontinence and ED.\par start vesicare and see if that helps.\par PSA / MRI Sept 2021\par \par Thank you very much for allowing me to assist in the care of this patient. Should you have any additional questions or concerns please do not hesitate to contact me.\par \par \par Sincerely,\par \par \par Elio Emanuel D.O.\par  of Urology and Radiology\par  of Urology at [facility]\par System Director for Prostate Cancer\par [address] [place] [address], [address]\par Phone: [phone]

## 2021-06-07 NOTE — HISTORY OF PRESENT ILLNESS
[Home] : at home, [unfilled] , at the time of the visit. [Medical Office: ([facility])___] : at the medical office located in  [Spouse] : spouse [Verbal consent obtained from patient] : the patient, [unfilled] [FreeTextEntry1] : Dear Dr. Kee Mclaughlin ([facility], [facility] ([phone])\par \par Chief Complaint: Post Focal Therapy\par Date of first visit: 06/15/2020\par Occupation:  [place]\par \par KEE MURRAY  is a 70 year old , with recently dx PCa,  he has two malignancies and was [facility]. He is s/p focal ablation of his prostate tumor on 9/30/21.  The patient had two anterior lesions treated.  The patient's base line PSA was 2.07 and now his PSA is 0.7 ng/ml which is the normal response.  The patient also reports complaints of urge associated incontinence and wears a pad for protection.  He also reports worsening of his erectile dysfunction.   He is taking MUSE and failed oral therapies.\par \par s/p TP MR Fusion Prostate Biopsy on 06/29/20.\par path: 1) GG1, right anterior base, involving 70%, measuring 9 mm 2) GG1 right posterior apex, involving 25%, measuring 3 mm 3) GG2, left apex PZa (5% pattern 4), involving 5%, measuring 1 mm.  The RAB and Left [place] PZa were both visible on MRI\par \par Decipher test on 06/30/2020: 18.2% Risk of HG dz,  2.4% risk of mets w/i 5 years, 3.3% PCSM at 1- years\par \par He had a prostate nodule and a PSA 2.03 on 04/08/2020. His IntelliScore was 47% risk of HG dz 04/20. His prior PSA was 2.07 in 01/2019. Patient has a FMHx of Prostate Cancer. \par \par MRI ([facility]) official read on 05/30/2020. 42 cc, PSAD 0.04, PIRADS 4 lesion measuring 11 x 5 mm in left anterior midgland PZ. No LAD. No EPE. No Bony Lesions. The images have been reviewed and clinical implications discussed with the patient. The NAYE was + in right mid which corresponds with a second lesion i am adding for the biopsy 4 out of 5. \par \par Patient is also s/p Left partial nephrectomy in 06/19 for 1.8 cm lesion path- Clear Cell RCC that has been followed by Dr. Moody, last visit in 12/2019.\par \par \par 6/15/2020\par IPSS 2 QOL 1 \par NIKKI 24\par \par The patient denies fevers, chills, nausea and or vomiting and no unexplained weight loss.\par \par All pertinent laboratory, films and physician notes were reviewed.  Questionnaire results were discussed with patient.\par

## 2021-06-07 NOTE — PHYSICAL EXAM
[General Appearance - Well Developed] : well developed [General Appearance - Well Nourished] : well nourished [] : no respiratory distress [Affect] : the affect was normal [Oriented To Time, Place, And Person] : oriented to person, place, and time [Not Anxious] : not anxious [Mood] : the mood was normal

## 2021-08-17 ENCOUNTER — APPOINTMENT (OUTPATIENT)
Dept: SURGERY | Facility: CLINIC | Age: 71
End: 2021-08-17

## 2021-11-02 NOTE — PHYSICAL EXAM
[General Appearance - Well Developed] : well developed [General Appearance - Well Nourished] : well nourished [] : no respiratory distress [Oriented To Time, Place, And Person] : oriented to person, place, and time [Affect] : the affect was normal [Mood] : the mood was normal [Not Anxious] : not anxious

## 2021-11-04 ENCOUNTER — OUTPATIENT (OUTPATIENT)
Dept: OUTPATIENT SERVICES | Facility: HOSPITAL | Age: 71
LOS: 1 days | End: 2021-11-04
Payer: MEDICARE

## 2021-11-04 ENCOUNTER — APPOINTMENT (OUTPATIENT)
Dept: MRI IMAGING | Facility: IMAGING CENTER | Age: 71
End: 2021-11-04
Payer: MEDICARE

## 2021-11-04 ENCOUNTER — RESULT REVIEW (OUTPATIENT)
Age: 71
End: 2021-11-04

## 2021-11-04 DIAGNOSIS — Z98.890 OTHER SPECIFIED POSTPROCEDURAL STATES: Chronic | ICD-10-CM

## 2021-11-04 DIAGNOSIS — Z98.52 VASECTOMY STATUS: Chronic | ICD-10-CM

## 2021-11-04 DIAGNOSIS — Z90.5 ACQUIRED ABSENCE OF KIDNEY: Chronic | ICD-10-CM

## 2021-11-04 DIAGNOSIS — C61 MALIGNANT NEOPLASM OF PROSTATE: ICD-10-CM

## 2021-11-04 PROCEDURE — 72197 MRI PELVIS W/O & W/DYE: CPT

## 2021-11-04 PROCEDURE — 72197 MRI PELVIS W/O & W/DYE: CPT | Mod: 26,MH

## 2021-11-04 PROCEDURE — 76498 UNLISTED MR PROCEDURE: CPT

## 2021-11-04 PROCEDURE — 76498P: CUSTOM | Mod: 26,MH

## 2021-11-04 PROCEDURE — A9585: CPT

## 2021-11-05 ENCOUNTER — TRANSCRIPTION ENCOUNTER (OUTPATIENT)
Age: 71
End: 2021-11-05

## 2021-11-08 ENCOUNTER — APPOINTMENT (OUTPATIENT)
Dept: UROLOGY | Facility: CLINIC | Age: 71
End: 2021-11-08
Payer: MEDICARE

## 2021-11-08 PROCEDURE — 99214 OFFICE O/P EST MOD 30 MIN: CPT | Mod: 95

## 2021-11-08 NOTE — HISTORY OF PRESENT ILLNESS
[Home] : at home, [unfilled] , at the time of the visit. [Medical Office: ([facility])___] : at the medical office located in  [Spouse] : spouse [Verbal consent obtained from patient] : the patient, [unfilled] [FreeTextEntry1] : Dear Dr. Jason Mclaughlin ([facility], [facility] ([phone])\par \par Chief Complaint: Post Focal Therapy\par Date of first visit: 06/15/2020\par Occupation:  [employer]\par \par JASON MURRAY  is a 71 year old , with recently dx PCa,  he has two malignancies and [facility] [place] [facility] [place]. He is s/p focal ablation of his prostate tumor on 9/30/20.  The patient had two anterior lesions treated.  The patient's base line PSA was 2.07 and now his PSA is 0.9  ng/ml which is 0.02 increase from 0.7 ng/ml (this is within normal limits). there was a right apex posterior area - that was not visualized during prior MRI - this updated MRI demonstrates - two tiny areas that would not rise to level of a clinic sig lesion but we will biopsy them.\par \par The patient also reports complaints of urge associated incontinence and wears a pad for protection.  He also reports worsening of his erectile dysfunction.   He is taking MUSE and failed oral therapies.\par \par s/p TP MR Fusion Prostate Biopsy on 06/29/20.\par path: 1) GG1, right anterior base, involving 70%, measuring 9 mm 2) GG1 right posterior apex, involving 25%, measuring 3 mm 3) GG2, left apex PZa (5% pattern 4), involving 5%, measuring 1 mm.  The RAB and Left [place] PZa were both visible on MRI\par \par Decipher test on 06/30/2020: 18.2% Risk of HG dz,  2.4% risk of mets w/i 5 years, 3.3% PCSM at 1- years\par \par He had a prostate nodule and a PSA 2.03 on 04/08/2020. His IntelliScore was 47% risk of HG dz 04/20. His prior PSA was 2.07 in 01/2019. Patient has a FMHx of Prostate Cancer. \par \par Patient is also s/p Left partial nephrectomy in 06/19 for 1.8 cm lesion path- Clear Cell RCC that has been followed by Dr. Moody, last visit in 12/2019.\par \par PSA Hx\par 0.92 10/6/21\par 3.16 6/16/20\par 2.03 4/8/20\par 2.07 1/8/19\par \par MRI Hx\par MRI [facility] on 5/07/2021.  4 x 3.9 x 4.2 cm; volume 33 cc prostate with PIRADS 3 lesion measuring 5 x mm right, posterior lateral, base, peripheral zone lesion. No LAD No EPE, No Bony Lesions.  The images have been reviewed and clinical implications discussed with the patient.\par \par MRI ([facility]) official read on 05/30/2020. 42 cc, PSAD 0.04, PIRADS 4 lesion measuring 11 x 5 mm in left anterior midgland PZ. No LAD. No EPE. No Bony Lesions. The images have been reviewed and clinical implications discussed with the patient. The NAYE was + in right mid which corresponds with a second lesion i am adding for the biopsy 4 out of 5. \par \par Biopsy Hx\par 6/25/20-fusion with Dr Emanuel\par RAB: [place] 3+3, 70%, 9mm\par RPA: [place] 3+3, 25%, 3mm\par Left apex PZa: [place] 3+4, 5%, 1mm; focus too small to evaluate percent of pattern 4\par HGPIN in left apex pza, right mid pzpm, and left mid pzpl\par \par 11/08/2021\par IPSS    QOL\par NIKKI\par \par 6/15/2020\par IPSS 2 QOL 1 \par NIKKI 24\par \par The patient denies fevers, chills, nausea and or vomiting and no unexplained weight loss.\par \par All pertinent laboratory, films and physician notes were reviewed.  Questionnaire results were discussed with patient.\par

## 2021-11-08 NOTE — ASSESSMENT
[FreeTextEntry1] : 71 year old  male with a history of renal cell carcinoma treated by Dr. Moody (recurrence free) - the patient was found to have a positive NAYE that trigger an evaluation - exomedx high risk (47.9% GG2 or >) prostate cancer.  The patient has two malignancies and exposure at [facility].\par \par The MRI demonstrates no signs of recurrence.\par 1 year biopsy - RPA was not treated. 3mm - no signs onf MRI\par \par Prostate Cancer\par 1) GG1, right anterior base, involving 70%, measuring 9 mm 2) GG1 right posterior apex, involving 25%, measuring 3 mm 3) GG2, left apex PZa (5% pattern 4), involving 5%, measuring 1 mm.  The RAB and Left [place] PZa were both visible on MRI\par \par s/p focal therapy 9/30/20 - two anterior areas treated.  PSA decreased to 0.7 ng/ml\par he is reporting urge associated incontinence and ED.\par start vesicare and see if that helps.\par PSA / MRI Sept 2021--> Biopsy.\par \par IRB Notification\par \par The patient has been made aware of the opportunity to participate in our MR US fusion guided biopsy trial testing the next generation biopsy technology.  This is an IRB approved trial (IRB #).  He is already being consented for a standard MR US fusion guided biopsy therefore there is no change in his clinical work flow.  He has been given a copy of the consent to review before the biopsy date and given an opportunity to contact us with any further questions.  He will be consented on the day of the biopsy procedure.\par \par He understands that many men with prostate cancer will die with the disease rather than of it and we also discussed the results large multi-center American and  prostate cancer screening trials. He also understands that PSA in and of itself does not diagnose prostate cancer but only assesses risk to a certain degree. The patient understands that to definitively screen for prostate cancer, a biopsy is required and this procedure has risks, including bleeding, infection, ED and urinary retention. The patient opted to move forward with the biopsy.\par \par The patient is aware to expect hematuria x 2 weeks and upto 4 weeks of hematospermia.  There is a risk of infection albeit much lower than a transrectal approach. In some cases patients can experience erectile dysfunction but this is usually self limiting.  Any fever/chills after the biopsy the patient is to contact the office and go to the ER for an immediate evaluation. He has been given paper instructions outlining these items - which includes medications to avoid prior to surgery.\par \par 1. CBC, BMP, PSA, Covid Test, UA UCx\par 2. Medical Clearance\par 3. TP biopsy at [facility]\par 4. follow up 2 weeks after biopsy with his primary urologist or ourselves.\par 5. we will call with the path results once they are resulted.\par \par Thank you very much for allowing me to assist in the care of this patient. Should you have any additional questions or concerns please do not hesitate to contact me.\par \par \par Sincerely,\par \par \par Elio Emanuel D.O.\par  of Urology and Radiology\par  of Urology at [facility]\par System Director for Prostate Cancer\par [address] [place] [address], 5[address] Floor [facility], [facility]\par Phone: [phone]\par
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
## 2021-11-23 ENCOUNTER — NON-APPOINTMENT (OUTPATIENT)
Age: 71
End: 2021-11-23

## 2021-12-08 ENCOUNTER — TRANSCRIPTION ENCOUNTER (OUTPATIENT)
Age: 71
End: 2021-12-08

## 2021-12-08 ENCOUNTER — RESULT REVIEW (OUTPATIENT)
Age: 71
End: 2021-12-08

## 2021-12-08 LAB — SARS-COV-2 N GENE NPH QL NAA+PROBE: NOT DETECTED

## 2021-12-09 ENCOUNTER — OUTPATIENT (OUTPATIENT)
Dept: OUTPATIENT SERVICES | Facility: HOSPITAL | Age: 71
LOS: 1 days | Discharge: HOME | End: 2021-12-09
Payer: MEDICARE

## 2021-12-09 ENCOUNTER — APPOINTMENT (OUTPATIENT)
Dept: UROLOGY | Facility: AMBULATORY SURGERY CENTER | Age: 71
End: 2021-12-09

## 2021-12-09 DIAGNOSIS — Z98.890 OTHER SPECIFIED POSTPROCEDURAL STATES: Chronic | ICD-10-CM

## 2021-12-09 DIAGNOSIS — Z98.52 VASECTOMY STATUS: Chronic | ICD-10-CM

## 2021-12-09 DIAGNOSIS — Z90.5 ACQUIRED ABSENCE OF KIDNEY: Chronic | ICD-10-CM

## 2021-12-09 PROCEDURE — 55706 BX PRST8 NDL SAT SAMPLING: CPT

## 2021-12-09 PROCEDURE — 76872 US TRANSRECTAL: CPT | Mod: 26

## 2021-12-09 PROCEDURE — 88305 TISSUE EXAM BY PATHOLOGIST: CPT | Mod: 26

## 2021-12-10 ENCOUNTER — NON-APPOINTMENT (OUTPATIENT)
Age: 71
End: 2021-12-10

## 2021-12-13 ENCOUNTER — NON-APPOINTMENT (OUTPATIENT)
Age: 71
End: 2021-12-13

## 2021-12-13 LAB — SURGICAL PATHOLOGY STUDY: SIGNIFICANT CHANGE UP

## 2021-12-28 ENCOUNTER — APPOINTMENT (OUTPATIENT)
Dept: UROLOGY | Facility: CLINIC | Age: 71
End: 2021-12-28

## 2022-05-01 ENCOUNTER — EMERGENCY (EMERGENCY)
Facility: HOSPITAL | Age: 72
LOS: 1 days | Discharge: HOME | End: 2022-05-01
Attending: EMERGENCY MEDICINE | Admitting: EMERGENCY MEDICINE
Payer: MEDICARE

## 2022-05-01 VITALS
SYSTOLIC BLOOD PRESSURE: 156 MMHG | TEMPERATURE: 99 F | WEIGHT: 225.09 LBS | DIASTOLIC BLOOD PRESSURE: 86 MMHG | HEART RATE: 59 BPM | OXYGEN SATURATION: 98 % | HEIGHT: 69 IN | RESPIRATION RATE: 17 BRPM

## 2022-05-01 DIAGNOSIS — Z98.890 OTHER SPECIFIED POSTPROCEDURAL STATES: Chronic | ICD-10-CM

## 2022-05-01 DIAGNOSIS — Z98.52 VASECTOMY STATUS: Chronic | ICD-10-CM

## 2022-05-01 DIAGNOSIS — Z90.5 ACQUIRED ABSENCE OF KIDNEY: Chronic | ICD-10-CM

## 2022-05-01 PROCEDURE — 99282 EMERGENCY DEPT VISIT SF MDM: CPT

## 2022-05-01 PROCEDURE — 99283 EMERGENCY DEPT VISIT LOW MDM: CPT | Mod: FS

## 2022-05-01 PROCEDURE — 99284 EMERGENCY DEPT VISIT MOD MDM: CPT

## 2022-05-01 RX ORDER — GLYCERIN 1 %
1 DROPS OPHTHALMIC (EYE)
Qty: 1 | Refills: 0
Start: 2022-05-01

## 2022-05-01 RX ORDER — ERYTHROMYCIN BASE 5 MG/GRAM
1 OINTMENT (GRAM) OPHTHALMIC (EYE)
Qty: 1 | Refills: 0
Start: 2022-05-01

## 2022-05-01 RX ORDER — KETOROLAC TROMETHAMINE 0.5 %
1 DROPS OPHTHALMIC (EYE)
Qty: 1 | Refills: 0
Start: 2022-05-01

## 2022-05-01 NOTE — ED PROVIDER NOTE - CLINICAL SUMMARY MEDICAL DECISION MAKING FREE TEXT BOX
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 he awoke this am with itchy red eye. so during the course of the day today he tried to irrigate out the eye with bottled water, then with sterile saline but the eye became more and more irritated. now iwht tearing pain and redness he came toe rfor eval he has no vision changes no difficulties with bright lights no fever no headache no other complaints  on eval  wd wn male whose VA ou is 20/20 corrected at 14 inches with hand card and 20/25 OD corrected  the eye has moderate scleral injection and erythema with lid swelling of the puncta and drainage of clear tears  no pus fluorescin reveals a small uptake at the lower lateral scleral edge there is limbic sparing and there is normal curve of the eye without any cell flare or hyphema  no visible fb noted nonlinear scratching, pt got relief of discomfort with the tetracaine.  no other mass  left eye not injected lids not swollen he was actively rubbing r eye when I entered room  plan antihistamines, abx, pain med referral to optho in am

## 2022-05-01 NOTE — ED PROVIDER NOTE - ATTENDING APP SHARED VISIT CONTRIBUTION OF CARE
71 yo male hx of prostate and kidney cancer who was in [facility] [place] out doors yesterday at [employer] where he [place] and works part time. he awoke this am with itchy red eye. so during the course of the day today he tried to irrigate out the eye with bottled water, then with sterile saline but the eye became more and more irritated. now iwht tearing pain and redness he came toe rfor eval he has no vision changes no difficulties with bright lights no fever no headache no other complaints  on eval  wd wn male whose VA ou is 20/20 corrected at 14 inches with hand card and 20/25 OD corrected  the eye has moderate scleral injection and erythema with lid swelling of the puncta and drainage of clear tears  no pus fluorescin reveals a small uptake at the lower lateral scleral edge there is limbic sparing and there is normal curve of the eye without any cell flare or hyphema  no visible fb noted nonlinear scratching, pt got relief of discomfort with the tetracaine.  no other mass  left eye not injected lids not swollen he was actively rubbing r eye when I entered room  plan antihistamines, abx, pain med referral to optho in am

## 2022-05-01 NOTE — ED PROVIDER NOTE - OBJECTIVE STATEMENT
72 M c/o right eye redness, irritation since this AM. Denies foreign body, injury, chemical exposure. No issue with eye when he went to sleep last night. Tried flushing eye today with water then saline. Agnieszka visual changes.

## 2022-05-01 NOTE — ED ADULT NURSE NOTE - CAS DISCH CONDITION
Chief Complaint   Patient presents with   • Follow-up   • Other     pt declined flu shot       HPI:    Selina Miller 70 year old female     Pt is here for f/u on htn, dm and hyperlipidemia and gout  Needs refill on meds    Diabetes on glipizide and metformin 2 times a day  Blood sugars are elevated 150-200 range  Denies any tingling numbness of the legs, no vision problem    Hypertension on lisinopril and amlodipine  Taking every day denies headache dizziness or lightheadedness  No vision problem, no chest pain or short of breath    Diabetic dyslipidemia on statin  Abdominal pain nausea or vomiting    Gout stable on allopurinol no flareups      Review of Systems   Respiratory: Negative.    Cardiovascular: Negative.    Gastrointestinal: Negative.    Endocrine: Negative.    Neurological: Negative.        Current Medications:  Current Outpatient Medications   Medication Sig Dispense Refill   • fluticasone (VERAMYST) 27.5 MCG/SPRAY nasal spray Spray 2 sprays in each nostril daily.     • cholecalciferol (VITAMIN D3) 1000 UNITS tablet Take 1,000 Units by mouth daily.     • allopurinol (ZYLOPRIM) 100 MG tablet Take 1 tablet by mouth daily. 90 tablet 3   • amLODIPine (NORVASC) 10 MG tablet Take 1 tablet by mouth daily. 90 tablet 3   • lisinopril (PRINIVIL,ZESTRIL) 40 MG tablet Take 1 tablet by mouth daily. 90 tablet 3   • ASPIRIN LOW DOSE 81 MG EC tablet TAKE 1 TABLET BY MOUTH DAILY 90 tablet 0   • blood glucose test strip Check bs 2 times a day 180 strip 3   • Lancets 30G Misc 2 times daily. 180 each 6   • Blood Glucose Monitoring Suppl (D-CARE GLUCOMETER) w/Device Kit 1 Device 2 times daily. 1 kit 0   • glipizide-metformin (METAGLIP) 2.5-500 MG per tablet Take 1 tab three times a day 90 tablet 3   • ONE TOUCH ULTRA TEST test strip CHECK BLOOD SUGAR TWICE DAILY 100 strip 0     No current facility-administered medications for this visit.        Relevant Past Medical History:  Past Medical History:    Diagnosis Date   • Diabetes mellitus (CMS/[facility])        Social History     Socioeconomic History   • Marital status: Single     Spouse name: Not on file   • Number of children: Not on file   • Years of education: Not on file   • Highest education level: Not on file   Occupational History   • Not on file   Social Needs   • Financial resource strain: Not on file   • Food insecurity:     Worry: Not on file     Inability: Not on file   • Transportation needs:     Medical: Not on file     Non-medical: Not on file   Tobacco Use   • Smoking status: Never Smoker   • Smokeless tobacco: Never Used   Substance and Sexual Activity   • Alcohol use: Yes   • Drug use: Never   • Sexual activity: Not on file   Lifestyle   • Physical activity:     Days per week: Not on file     Minutes per session: Not on file   • Stress: Not at all   Relationships   • Social connections:     Talks on phone: Not on file     Gets together: Not on file     Attends Islam service: Not on file     Active member of club or organization: Not on file     Attends meetings of clubs or organizations: Not on file     Relationship status: Not on file   • Intimate partner violence:     Fear of current or ex partner: Not on file     Emotionally abused: Not on file     Physically abused: Not on file     Forced sexual activity: Not on file   Other Topics Concern   • Not on file   Social History Narrative   • Not on file       No past surgical history on file.    ALLERGIES:  Allergies no known allergies     Family History   Problem Relation Age of Onset   • Asthma Mother    • Diabetes Daughter        Examination:   Blood pressure 146/83, pulse 91, temperature 97.7 °F (36.5 °C), resp. rate 16, height 5' (1.524 m), weight 75.8 kg (167 lb 1.7 oz), SpO2 99 %.  Weight    09/27/19 1636   Weight: 75.8 kg (167 lb 1.7 oz)       Physical Exam  Vitals signs and nursing note reviewed.   Constitutional:       Appearance: Normal appearance.   HENT:      Head: Normocephalic and  atraumatic.      Right Ear: Tympanic membrane normal.      Left Ear: Tympanic membrane normal.      Nose: Nose normal.      Mouth/Throat:      Mouth: Mucous membranes are moist.   Eyes:      General:         Right eye: No discharge.         Left eye: Discharge present.     Extraocular Movements: Extraocular movements intact.      Conjunctiva/sclera: Conjunctivae normal.      Pupils: Pupils are equal, round, and reactive to light.   Cardiovascular:      Rate and Rhythm: Normal rate and regular rhythm.      Heart sounds: Normal heart sounds. No murmur.   Pulmonary:      Effort: Pulmonary effort is normal.      Breath sounds: Normal breath sounds.   Abdominal:      General: Abdomen is flat. Bowel sounds are normal. There is no distension.      Palpations: Abdomen is soft. There is no mass.      Tenderness: There is no tenderness. There is no guarding or rebound.      Hernia: No hernia is present.   Lymphadenopathy:      Cervical: No cervical adenopathy.   Neurological:      General: No focal deficit present.      Mental Status: She is alert and oriented to person, place, and time.   Psychiatric:         Mood and Affect: Mood normal.         Behavior: Behavior normal.         Thought Content: Thought content normal.         Judgement: Judgment normal.          Assessment/Plan:     Problem List Items Addressed This Visit        Digestive    Vitamin D deficiency    Relevant Medications    amLODIPine (NORVASC) 10 MG tablet    Other Relevant Orders    VITAMIN D -25 HYDROXY       Endocrine    DM type 2 with diabetic dyslipidemia (CMS/HCC) - Primary    Relevant Medications    blood glucose test strip    Lancets 30G Misc    Blood Glucose Monitoring Suppl (D-CARE GLUCOMETER) w/Device Kit    glipizide-metformin (METAGLIP) 2.5-500 MG per tablet    Other Relevant Orders    MICROALBUMIN URINE RANDOM    SERVICE TO OPHTHALMOLOGY    Gout    Relevant Medications    allopurinol (ZYLOPRIM) 100 MG tablet       Other    Immunization not  carried out because of patient refusal    Visit for screening mammogram    Relevant Orders    MAMMO SCREENING BILATERAL W NIKOLAI      Other Visit Diagnoses     Essential hypertension        Relevant Medications    amLODIPine (NORVASC) 10 MG tablet    lisinopril (PRINIVIL,ZESTRIL) 40 MG tablet    Hypercalcemia        Relevant Orders    PARATHYROID HORMONE INTACT WITHOUT CALCIUM    Elevated transaminase level        Relevant Orders    COMPREHENSIVE METABOLIC PANEL    HEPATITIS PANEL CHRONIC WITH REFLEX        Mild elevation of transaminases  Patient is asymptomatic  DC the statin, repeat labs in 1 month  Has seen the GI in past was thought due to statin  LFTs were normal after stopping the statin  Hepatitis panel was negative    Hypertension slightly elevated   Low-salt diet  Refill done on lisinopril and amlodipine    Diabetic dyslipidemia  Hemoglobin A1c 8.6 9/' 19  Increase the glipizide metformin to tid  Has ophthalmology appointment next month referral referral given    Hypercalcemia  Check additional labs  On and off    Gout stable refill done on allopurinol    mammo- due 7/'19 ref  done  colonoscopy - due 3/2026  DEXA scan done 11/' 17 was normal    Lab Services on 09/25/2019   Component Date Value Ref Range Status   • WBC 09/25/2019 8.3  4.2 - 11.0 K/mcL Final   • RBC 09/25/2019 4.77  4.00 - 5.20 mil/mcL Final   • HGB 09/25/2019 12.4  12.0 - 15.5 g/dL Final   • HCT 09/25/2019 39.4  36.0 - 46.5 % Final   • MCV 09/25/2019 82.6  78.0 - 100.0 fl Final   • MCH 09/25/2019 26.0  26.0 - 34.0 pg Final   • MCHC 09/25/2019 31.5* 32.0 - 36.5 g/dL Final   • RDW-CV 09/25/2019 14.5  11.0 - 15.0 % Final   • PLT 09/25/2019 346  140 - 450 K/mcL Final   • NRBC 09/25/2019 0  0 /100 WBC Final   • DIFF TYPE 09/25/2019 AUTOMATED DIFFERENTIAL   Final   • Neutrophil 09/25/2019 51  % Final   • LYMPH 09/25/2019 34  % Final   • MONO 09/25/2019 6  % Final   • EOSIN 09/25/2019 8  % Final   • BASO 09/25/2019 1  % Final   • Percent Immature  Granuloctyes 09/25/2019 0  % Final   • Absolute Neutrophil 09/25/2019 4.2  1.8 - 7.7 K/mcL Final   • Absolute Lymph 09/25/2019 2.8  1.0 - 4.0 K/mcL Final   • Absolute Mono 09/25/2019 0.5  0.3 - 0.9 K/mcL Final   • Absolute Eos 09/25/2019 0.7* 0.1 - 0.5 K/mcL Final   • Absolute Baso 09/25/2019 0.1  0.0 - 0.3 K/mcL Final   • Absolute Immature Granulocytes 09/25/2019 0.0  0 - 0.2 K/mcl Final   • Fasting Status 09/25/2019 FASTING  hrs Final   • Sodium 09/25/2019 138  135 - 145 mmol/L Final   • Potassium 09/25/2019 4.3  3.4 - 5.1 mmol/L Final   • Chloride 09/25/2019 106  98 - 107 mmol/L Final   • Carbon Dioxide 09/25/2019 25  21 - 32 mmol/L Final   • Anion Gap 09/25/2019 11  10 - 20 mmol/L Final   • Glucose 09/25/2019 164* 65 - 99 mg/dL Final   • BUN 09/25/2019 10  6 - 20 mg/dL Final   • Creatinine 09/25/2019 0.78  0.51 - 0.95 mg/dL Final   • GFR Estimate,  09/25/2019 90   Final    eGFR results = or >90 mL/min/1.73m2 = Normal kidney function.   • GFR Estimate, Non  09/25/2019 78   Final    eGFR 60 - 89 mL/min/1.73m2 = Mild decrease in kidney function.   • BUN/Creatinine Ratio 09/25/2019 13  7 - 25 Final   • CALCIUM 09/25/2019 10.4* 8.4 - 10.2 mg/dL Final   • TOTAL BILIRUBIN 09/25/2019 0.6  0.2 - 1.0 mg/dL Final   • AST/SGOT 09/25/2019 96* <38 Units/L Final   • ALT/SGPT 09/25/2019 142* <64 Units/L Final   • ALK PHOSPHATASE 09/25/2019 136* 45 - 117 Units/L Final   • TOTAL PROTEIN 09/25/2019 8.3* 6.4 - 8.2 g/dL Final   • Albumin 09/25/2019 4.0  3.6 - 5.1 g/dL Final   • GLOBULIN 09/25/2019 4.3* 2.0 - 4.0 g/dL Final   • A/G Ratio, Serum 09/25/2019 0.9* 1.0 - 2.4 Final   • Hemoglobin A1C 09/25/2019 8.6* 4.5 - 5.6 % Final    Comment:    ----DIABETIC SCREENING---  NON DIABETIC                 <5.7%  INCREASED RISK                5.7-6.4%  DIAGNOSTIC FOR DIABETES      >6.4%     ----DIABETIC CONTROL---     A1C%           eAG mg/dL  6.0            126  6.5            140  7.0            154  7.5             169  8.0            183  8.5            197  9.0            212  9.5            226  10.0           240     • FASTING STATUS 09/25/2019 FASTING  hrs Final   • CHOLESTEROL 09/25/2019 158  <200 mg/dL Final    Comment:    Desirable            <200  Borderline High      200 to 239  High                 >=240        • CALCULATED LDL 09/25/2019 80  <130 mg/dL Final    Comment: OPTIMAL               <100  NEAR OPTIMAL          100-129  BORDERLINE HIGH       130-159  HIGH                  160-189  VERY HIGH             >=190     • HDL 09/25/2019 53  >49 mg/dL Final    Comment: Low            <40  Borderline Low 40 to 49  Near Optimal   50 to 59  Optimal        >=60     • TRIGLYCERIDE 09/25/2019 127  <150 mg/dL Final    Comment: Normal                   <150  Borderline High          150 to 199  High                     200 to 499  Very High                >=500     • CALCULATED NON HDL 09/25/2019 105  mg/dL Final    Comment:    Therapeutic Target:  CHD and risk equivalents <130  Multiple risk factors    <160  0 to 1 risk factors      <190        • CHOL/HDL 09/25/2019 3.0  <4.5 Final   • COLOR 09/25/2019 YELLOW  YELLOW Final   • APPEARANCE 09/25/2019 HAZY   Final   • GLUCOSE(URINE) 09/25/2019 NEGATIVE  NEGATIVE mg/dL Final   • BILIRUBIN 09/25/2019 NEGATIVE  NEGATIVE Final   • KETONES 09/25/2019 NEGATIVE  NEGATIVE mg/dL Final   • SPECIFIC GRAVITY 09/25/2019 1.019  1.005 - 1.030 Final   • BLOOD 09/25/2019 NEGATIVE  NEGATIVE Final   • pH 09/25/2019 6.0  5.0 - 7.0 Units Final   • PROTEIN(URINE) 09/25/2019 30* NEGATIVE mg/dL Final   • UROBILINOGEN 09/25/2019 0.2  0.0 - 1.0 mg/dL Final   • NITRITE 09/25/2019 NEGATIVE  NEGATIVE Final   • LEUKOCYTE ESTERASE 09/25/2019 SMALL* NEGATIVE Final    Culture indicated, results to follow.   • Squamous EPI'S 09/25/2019 6 to 10  0 - 5 /hpf Final   • RBC 09/25/2019 1 to 2  0 - 2 /hpf Final   • WBC 09/25/2019 6 to 10  0 - 5 /hpf Final   • BACTERIA 09/25/2019 FEW* NONE SEEN /hpf Final   •  Hyaline Casts 09/25/2019 NONE SEEN  0 - 5 /lpf Final   • SPECIMEN TYPE 09/25/2019 URINE, CLEAN CATCH/MIDSTREAM   Final   • TRANSITIONAL EPI'S 09/25/2019 1 to 5  /hpf Final   • MUCOUS 09/25/2019 PRESENT   Final   • Specimen Description 09/25/2019 URINE, CLEAN CATCH/MIDSTREAM   Final   • CULTURE 09/25/2019 60,000 ,000 CFU/mL MULTIPLE ORGANISMS ISOLATED WITH NO PREDOMINANT TYPE, CONSISTENT WITH CONTAMINATION. CONSIDER RECOLLECTION.   Final   • REPORT STATUS 09/25/2019 09/27/2019 FINAL   Final                          Return in about 1 month (around 10/27/2019).        Evan Arrieta MD    9/27/2019             Stable

## 2022-05-01 NOTE — ED PROVIDER NOTE - NS ED ATTENDING STATEMENT MOD
This was a shared visit with the BC. I reviewed and verified the documentation and independently performed the documented:

## 2022-05-01 NOTE — ED PROVIDER NOTE - CARE PROVIDER_API CALL
Anna Valero  OPHTHALMOLOGY  [address], [address]  [place] [address]  Phone: [phone]  Fax: [phone]  Follow Up Time:

## 2022-05-01 NOTE — ED ADULT TRIAGE NOTE - CHIEF COMPLAINT QUOTE
right eye irritation and pain. pt reports he "woke up and it was irritated and  progressively got worse"  Pt tried flushing   denies vision loss

## 2022-05-01 NOTE — ED PROVIDER NOTE - EYES, MLM
EOMI without pain. Left eye unremarkable. Right eye with erythema, clear drainage, 20/20, increased fluorescein uptake approx 7 oclock.

## 2022-05-01 NOTE — ED ADULT NURSE NOTE - OBJECTIVE STATEMENT
Patient is 71yo M presents with c/o left eye redness started when he woke up this morning. Patient denies trauma to eye. Patient reports the redness and discomfort has increased throughout the day despite flushing the eye with normal saline and administering several antibiotic eyedrops that were present at home. Patient denies visual changes, HA, dizziness, ear pain, any other complaints.

## 2022-05-01 NOTE — ED PROVIDER NOTE - NSFOLLOWUPINSTRUCTIONS_ED_ALL_ED_FT
Eye drops to both eyes as prescribed.  Follow up with ophthalmologist in 1-2 days.  Return to the Emergency Department for worsening or concerning symptoms.    ------------------------------------    A corneal abrasion is a scratch or injury to the clear covering over the front of your eye (cornea). This can be painful. It is important to get treatment for a corneal abrasion. If this problem is not treated, it can affect your eyesight (vision).      What are the causes?    •A poke in the eye.      •An object in the eye.      •Too much eye rubbing.      •Very dry eyes.      •Certain eye infections.      •Contact lenses that do not fit right or are worn for too long. You can also injure your cornea when putting contact lenses in your eye or taking them out.      •Eye surgery.      •Certain cornea problems may increase the chance of a corneal abrasion.      Sometimes, the cause is not known.      What are the signs or symptoms?    •Eye pain. The pain may get worse when you open and close your eye or when you move your eye.      •A feeling of something stuck in your eye.      •Tearing, redness, and sensitivity to light.      •Having trouble keeping your eye open, or not being able to keep it open.      •Blurred vision.      •Headache.        How is this diagnosed?    You may work with a health care provider who specializes in conditions of the eye (ophthalmologist). This condition may be diagnosed based on your medical history, symptoms, and an eye exam.      How is this treated?    •Washing out your eye.      •Removing anything that is stuck in your eye.      •Using antibiotic drops or ointment to treat or prevent an infection.      •Using a dilating drop to decrease irritation, swelling, and pain.      •Using steroid drops or ointment to treat redness, irritation, or swelling.      •Applying a cold, wet cloth (cold compress) or ice pack to ease the pain      •Taking pain medicine by mouth.      In some cases, an eye patch or bandage soft contact lens might also be used. An eye patch should not be used if the corneal abrasion was related to contact lens wear as it can increase the chance of infection in these eyes.      Follow these instructions at home:    Medicines     •Use eye drops or ointments as told by your doctor.      •If you were prescribed antibiotic drops or ointment, use them as told by your doctor. Do not stop using the antibiotic even if you start to feel better.      •Take over-the-counter and prescription medicines only as told by your doctor.    •Ask your doctor if the medicine prescribed to you:  •Requires you to avoid driving or using heavy machinery.    •Can cause trouble pooping (constipation). You may need to take these actions to prevent or treat trouble pooping:  •Drink enough fluid to keep your pee (urine) pale yellow.      •Take over-the-counter or prescription medicines.      •Eat foods that are high in fiber. These include beans, whole grains, and fresh fruits and vegetables.      •Limit foods that are high in fat and processed sugars. These include fried or sweet foods.          Using an eye patch     If you have an eye patch, wear it as told by your doctor.  •Do not drive or use machinery while wearing an eye patch.      •Follow instructions from your doctor about when to take off the patch.      General instructions    •Ask your doctor if you can use a cold, wet cloth on your eye to help with pain.      •Do not rub or touch your eye. Do not wash out your eye.      •Do not wear contact lenses until your doctor says that this is okay.      •Avoid bright light.      •Avoid straining your eyes.      •Keep all follow-up visits as told by your doctor. Doing this can help to prevent infection and loss of eyesight.        Contact a doctor if:    •You keep having eye pain and other symptoms for more than 2 days.      •You get new symptoms, such as more redness, watery eyes, or discharge.      •You have discharge that makes your eyelids stick together in the morning.      •Your eye patch becomes so loose that you can blink your eye.      •Symptoms come back after your eye heals.        Get help right away if:    •You have very bad eye pain that does not get better with medicine.      •You lose eyesight.        Summary    •A corneal abrasion is a scratch or injury to the clear covering over the front of the eye (cornea).      •It is important to get treatment for a corneal abrasion. If this problem is not treated, it can affect your eyesight (vision).      •Use eye drops or ointments as told by your doctor.      •If you have an eye patch, do not drive or use machinery while wearing it.      •Let your doctor know if your symptoms last for more than 2 days.      This information is not intended to replace advice given to you by your health care provider. Make sure you discuss any questions you have with your health care provider.

## 2022-05-01 NOTE — ED PROVIDER NOTE - PATIENT PORTAL LINK FT
You can access the FollowMyHealth Patient Portal offered by [facility] by registering at the following website: [email or web address]. By joining [email or web address]’s FollowMyHealth portal, you will also be able to view your health information using other applications (apps) compatible with our system.

## 2022-09-17 NOTE — ASU PREOP CHECKLIST - ANTIBIOTIC
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 Pt reporting L leg swelling.            01-Oct-2017 17:46 n/a

## 2022-11-18 ENCOUNTER — NON-APPOINTMENT (OUTPATIENT)
Age: 72
End: 2022-11-18

## 2023-01-17 NOTE — H&P PST ADULT - NEUROLOGICAL DETAILS
Advancement-Rotation Flap Text: The defect edges were debeveled with a #15 scalpel blade.  Given the location of the defect, shape of the defect and the proximity to free margins an advancement-rotation flap was deemed most appropriate.  Using a sterile surgical marker, an appropriate flap was drawn incorporating the defect and placing the expected incisions within the relaxed skin tension lines where possible. The area thus outlined was incised deep to adipose tissue with a #15 scalpel blade.  The skin margins were undermined to an appropriate distance in all directions utilizing iris scissors. sensation intact/normal strength/alert and oriented x 3/responds to verbal commands/responds to pain

## 2023-03-14 ENCOUNTER — APPOINTMENT (OUTPATIENT)
Dept: UROLOGY | Facility: CLINIC | Age: 73
End: 2023-03-14
Payer: MEDICARE

## 2023-03-14 VITALS
SYSTOLIC BLOOD PRESSURE: 158 MMHG | RESPIRATION RATE: 16 BRPM | DIASTOLIC BLOOD PRESSURE: 75 MMHG | BODY MASS INDEX: 32.58 KG/M2 | WEIGHT: 220 LBS | HEIGHT: 69 IN | HEART RATE: 70 BPM

## 2023-03-14 DIAGNOSIS — N40.2 NODULAR PROSTATE W/OUT LOWER URINARY TRACT SYMPTOMS: ICD-10-CM

## 2023-03-14 DIAGNOSIS — N28.89 OTHER SPECIFIED DISORDERS OF KIDNEY AND URETER: ICD-10-CM

## 2023-03-14 DIAGNOSIS — Z87.898 PERSONAL HISTORY OF OTHER SPECIFIED CONDITIONS: ICD-10-CM

## 2023-03-14 PROCEDURE — 99214 OFFICE O/P EST MOD 30 MIN: CPT

## 2023-03-14 NOTE — ASSESSMENT
[FreeTextEntry1] : 73 year old  male with a history of renal cell carcinoma treated by Dr. Moody (recurrence free) - the patient was found to have a positive NAYE that triggered an evaluation -GG1, right anterior base, involving 70%, measuring 9 mm 2) GG1 right posterior apex, involving 25%, measuring 3 mm 3) GG2, left apex PZa (5% pattern 4), involving 5%, measuring 1 mm.  The RAB and Left [place] PZa were both visible on MRI. Exomedx high risk (47.9% GG2 or >) prostate cancer.  He is now s/p focal cryo with a confirmatory 1 yr bx.  The patient has two malignancies and exposure at [facility].\par \par Prostate Cancer s/p focal therapy 9/30/20  \par - PSA today, continue q6 months\par - MRI Nov 2023 (2 years since prior)\par - Outside MRI [facility] - PIRADS 3 - follow up [facility] MRI Nov 2021.\par \par BPH/LUTS\par - Flow (limited volume) \par - PVR 45 cc\par - off vesicare\par \par ED NIKKI was 24 in 2020 - we will give refill once he gives us the info for his current dose.\par -  on injections for ED failed oral therapy.\par \par Kidney Cancer Follow up s/p Left partial nephrectomy in 06/19 for 1.8 cm lesion path- Clear Cell RCC that has been followed by Dr. oMody\par - MRI abdomen Renal Mass Protocol - Ordered\par \par Sincerely,\par \par \par Elio Emanuel D.O.\par Professor of Urology and Radiology\par  of Urology at [facility]\par System Director for Prostate Cancer\par [address] [facility], [address]\par Phone: [phone]

## 2023-03-14 NOTE — HISTORY OF PRESENT ILLNESS
[FreeTextEntry1] : Dear Dr. Jason Mclaughlin ([facility], Advanced Urology [facility] ([phone])\par \par Chief Complaint: Post Focal Therapy\par Date of first visit: 06/15/2020\par Occupation:  [employer]\par \par JASON MURRAY  is a 73 year old , with recently dx PCa,  he has two malignancies and [facility] [employer] [facility] [place]. He is s/p focal ablation of his prostate tumor on 9/30/20.  The patient had two anterior lesions treated.  The patient's base line PSA was 2.07 and now his PSA is 0.9  ng/ml which is 0.02 increase from 0.7 ng/ml (this is within normal limits). There was a right apex posterior area - that was not visualized during prior MRI - this updated MRI demonstrates - two tiny areas that would not rise to level of a clinical sig lesion but we choose to biopsy them.  Bx on 12/1/21 showed no evidence of residual disease. He is doing well and has had no new issues\par \par The patient also reports complaints of urge associated incontinence and wears a pad for protection.  He also reports worsening of his erectile dysfunction.   He is taking MUSE and failed oral therapies and now uses injections.\par \par s/p TP MR Fusion Prostate Biopsy on 06/29/20.\par path: 1) GG1, right anterior base, involving 70%, measuring 9 mm 2) GG1 right posterior apex, involving 25%, measuring 3 mm 3) GG2, left apex PZa (5% pattern 4), involving 5%, measuring 1 mm.  The RAB and Left [place] PZa were both visible on MRI\par \par Decipher test on 06/30/2020: 18.2% Risk of HG dz,  2.4% risk of mets w/i 5 years, 3.3% PCSM at 1- years\par \par He had a prostate nodule and a PSA 2.03 on 04/08/2020. His IntelliScore was 47% risk of HG dz 04/20. His prior PSA was 2.07 in 01/2019. Patient has a FMHx of Prostate Cancer. \par \par Patient is also s/p Left partial nephrectomy in 06/19 for 1.8 cm lesion path- Clear Cell RCC that has been followed by Dr. Moody, last visit in 12/2019.\par \par PSA Hx\par 0.92 10/6/21\par 3.16 6/16/20\par 2.03 4/8/20\par 2.07 1/8/19\par \par MRI Hx\par MRI 11/4/21 cc prostate with 34 cc, No lesions. No LAD No EPE, No Bony Lesions.  The images have been reviewed and clinical implications discussed with the patient.\par \par MRI [facility] on 5/07/2021.  4 x 3.9 x 4.2 cm; volume 33 cc prostate with PIRADS 3 lesion measuring 5 x mm right, posterior lateral, base, peripheral zone lesion. No LAD No EPE, No Bony Lesions.  The images have been reviewed and clinical implications discussed with the patient.\par \par MRI ([facility]) official read on 05/30/2020. 42 cc, PSAD 0.04, PIRADS 4 lesion measuring 11 x 5 mm in left anterior midgland PZ. No LAD. No EPE. No Bony Lesions. The images have been reviewed and clinical implications discussed with the patient. The NAYE was + in right mid which corresponds with a second lesion i am adding for the biopsy 4 out of 5. \par \par Biopsy Hx\par 12/09/21-with Dr. Emanuel\par Neg\par \par 6/25/20-fusion with Dr Emanuel\par RAB: [place] 3+3, 70%, 9mm\par RPA: [place] 3+3, 25%, 3mm\par Left apex PZa: [place] 3+4, 5%, 1mm; focus too small to evaluate percent of pattern 4\par HGPIN in left apex pza, right mid pzpm, and left mid pzpl\par \par 03/133/2023\par IPSS 11   QOL 2\par NIKKI 5 (off meds)\par \par 6/15/2020\par IPSS 2 QOL 1 \par NIKKI 24\par \par The patient denies fevers, chills, nausea and or vomiting and no unexplained weight loss.\par \par All pertinent laboratory, films and physician notes were reviewed.  Questionnaire results were discussed with patient.\par

## 2023-03-15 ENCOUNTER — TRANSCRIPTION ENCOUNTER (OUTPATIENT)
Age: 73
End: 2023-03-15

## 2023-03-15 LAB
ANION GAP SERPL CALC-SCNC: 12 MMOL/L
BASOPHILS # BLD AUTO: 0.04 K/UL
BASOPHILS NFR BLD AUTO: 0.5 %
BUN SERPL-MCNC: 19 MG/DL
CALCIUM SERPL-MCNC: 9.2 MG/DL
CHLORIDE SERPL-SCNC: 103 MMOL/L
CHOLEST SERPL-MCNC: 210 MG/DL
CO2 SERPL-SCNC: 26 MMOL/L
CREAT SERPL-MCNC: 1.17 MG/DL
EGFR: 66 ML/MIN/1.73M2
EOSINOPHIL # BLD AUTO: 0.16 K/UL
EOSINOPHIL NFR BLD AUTO: 2 %
GLUCOSE SERPL-MCNC: 96 MG/DL
HCT VFR BLD CALC: 48.1 %
HDLC SERPL-MCNC: 60 MG/DL
HGB BLD-MCNC: 15.5 G/DL
IMM GRANULOCYTES NFR BLD AUTO: 0.7 %
LDLC SERPL DIRECT ASSAY-MCNC: 141 MG/DL
LYMPHOCYTES # BLD AUTO: 1.59 K/UL
LYMPHOCYTES NFR BLD AUTO: 19.7 %
MAN DIFF?: NORMAL
MCHC RBC-ENTMCNC: 31.2 PG
MCHC RBC-ENTMCNC: 32.2 GM/DL
MCV RBC AUTO: 96.8 FL
MONOCYTES # BLD AUTO: 0.55 K/UL
MONOCYTES NFR BLD AUTO: 6.8 %
NEUTROPHILS # BLD AUTO: 5.67 K/UL
NEUTROPHILS NFR BLD AUTO: 70.3 %
PLATELET # BLD AUTO: 216 K/UL
POTASSIUM SERPL-SCNC: 4.2 MMOL/L
PSA FREE FLD-MCNC: 30 %
PSA FREE SERPL-MCNC: 0.19 NG/ML
PSA SERPL-MCNC: 0.64 NG/ML
RBC # BLD: 4.97 M/UL
RBC # FLD: 13.2 %
SODIUM SERPL-SCNC: 141 MMOL/L
WBC # FLD AUTO: 8.07 K/UL

## 2023-03-27 ENCOUNTER — APPOINTMENT (OUTPATIENT)
Dept: UROLOGY | Facility: CLINIC | Age: 73
End: 2023-03-27

## 2023-05-04 ENCOUNTER — TRANSCRIPTION ENCOUNTER (OUTPATIENT)
Age: 73
End: 2023-05-04

## 2023-06-22 ENCOUNTER — TRANSCRIPTION ENCOUNTER (OUTPATIENT)
Age: 73
End: 2023-06-22

## 2023-08-01 ENCOUNTER — APPOINTMENT (OUTPATIENT)
Dept: MRI IMAGING | Facility: CLINIC | Age: 73
End: 2023-08-01
Payer: COMMERCIAL

## 2023-08-01 ENCOUNTER — OUTPATIENT (OUTPATIENT)
Dept: OUTPATIENT SERVICES | Facility: HOSPITAL | Age: 73
LOS: 1 days | End: 2023-08-01
Payer: COMMERCIAL

## 2023-08-01 DIAGNOSIS — Z98.52 VASECTOMY STATUS: Chronic | ICD-10-CM

## 2023-08-01 DIAGNOSIS — Z90.5 ACQUIRED ABSENCE OF KIDNEY: Chronic | ICD-10-CM

## 2023-08-01 DIAGNOSIS — N28.89 OTHER SPECIFIED DISORDERS OF KIDNEY AND URETER: ICD-10-CM

## 2023-08-01 DIAGNOSIS — Z98.890 OTHER SPECIFIED POSTPROCEDURAL STATES: Chronic | ICD-10-CM

## 2023-08-01 PROCEDURE — 74183 MRI ABD W/O CNTR FLWD CNTR: CPT

## 2023-08-01 PROCEDURE — A9585: CPT

## 2023-08-01 PROCEDURE — 74183 MRI ABD W/O CNTR FLWD CNTR: CPT | Mod: 26

## 2023-08-09 ENCOUNTER — NON-APPOINTMENT (OUTPATIENT)
Age: 73
End: 2023-08-09

## 2023-08-26 NOTE — ED ADULT NURSE NOTE - NS ED NOTE  TALK SOMEONE YN
Please call Dr Bean's (cardiologist) office ([phone]) to schedule for an appointment to discuss the need for a cardiac angiogram.      AVOID ENSURE SHAKES AS THEY WILL INCREASE YOUR POTASSIUM    No

## 2023-11-29 ENCOUNTER — NON-APPOINTMENT (OUTPATIENT)
Age: 73
End: 2023-11-29

## 2023-12-11 ENCOUNTER — OUTPATIENT (OUTPATIENT)
Dept: OUTPATIENT SERVICES | Facility: HOSPITAL | Age: 73
LOS: 1 days | End: 2023-12-11
Payer: COMMERCIAL

## 2023-12-11 ENCOUNTER — APPOINTMENT (OUTPATIENT)
Dept: MRI IMAGING | Facility: CLINIC | Age: 73
End: 2023-12-11
Payer: COMMERCIAL

## 2023-12-11 ENCOUNTER — RESULT REVIEW (OUTPATIENT)
Age: 73
End: 2023-12-11

## 2023-12-11 DIAGNOSIS — Z90.5 ACQUIRED ABSENCE OF KIDNEY: Chronic | ICD-10-CM

## 2023-12-11 DIAGNOSIS — C61 MALIGNANT NEOPLASM OF PROSTATE: ICD-10-CM

## 2023-12-11 DIAGNOSIS — Z98.52 VASECTOMY STATUS: Chronic | ICD-10-CM

## 2023-12-11 DIAGNOSIS — Z98.890 OTHER SPECIFIED POSTPROCEDURAL STATES: Chronic | ICD-10-CM

## 2023-12-11 PROCEDURE — 72197 MRI PELVIS W/O & W/DYE: CPT

## 2023-12-11 PROCEDURE — A9585: CPT

## 2023-12-11 PROCEDURE — 72197 MRI PELVIS W/O & W/DYE: CPT | Mod: 26

## 2023-12-11 PROCEDURE — 76498 UNLISTED MR PROCEDURE: CPT

## 2023-12-11 PROCEDURE — 76498P: CUSTOM | Mod: 26

## 2023-12-19 ENCOUNTER — TRANSCRIPTION ENCOUNTER (OUTPATIENT)
Age: 73
End: 2023-12-19

## 2024-01-09 ENCOUNTER — APPOINTMENT (OUTPATIENT)
Dept: UROLOGY | Facility: CLINIC | Age: 74
End: 2024-01-09
Payer: COMMERCIAL

## 2024-01-09 VITALS
SYSTOLIC BLOOD PRESSURE: 142 MMHG | DIASTOLIC BLOOD PRESSURE: 70 MMHG | HEIGHT: 69 IN | HEART RATE: 65 BPM | BODY MASS INDEX: 34.8 KG/M2 | WEIGHT: 235 LBS

## 2024-01-09 DIAGNOSIS — N52.8 OTHER MALE ERECTILE DYSFUNCTION: ICD-10-CM

## 2024-01-09 DIAGNOSIS — C61 MALIGNANT NEOPLASM OF PROSTATE: ICD-10-CM

## 2024-01-09 PROCEDURE — 99214 OFFICE O/P EST MOD 30 MIN: CPT

## 2024-01-09 NOTE — PHYSICAL EXAM
[Normal] : supple with no thyromegaly or masses appreciated [] : no respiratory distress [Abdomen Soft] : soft [Abdomen Tenderness] : non-tender

## 2024-01-10 LAB
PSA FREE FLD-MCNC: 29 %
PSA FREE SERPL-MCNC: 0.25 NG/ML
PSA SERPL-MCNC: 0.84 NG/ML

## 2024-01-10 NOTE — HISTORY OF PRESENT ILLNESS
[FreeTextEntry1] : Dear Dr. Jason Mclaughlin ([facility], Advanced Urology [facility] ([phone])  Chief Complaint: Post Focal Therapy / Prostate Cancer Date of first visit: 06/15/2020 Occupation:  [employer]  JASON MURRAY  is a 73 year old , with recently dx PCa,  he has two malignancies and [place] [facility] [place]. He is s/p focal ablation of his prostate tumor on 9/30/20.  The patient had two anterior lesions treated.  The patient's base line PSA was 2.07 and now his PSA is 0.9  ng/ml which is 0.02 increase from 0.7 ng/ml (this is within normal limits). There was a right apex posterior area - that was not visualized during prior MRI - this updated MRI demonstrates - two tiny areas that would not rise to level of a clinical sig lesion but we choose to biopsy them.  Bx on 12/1/21 showed no evidence of residual disease. He is doing well and has had no new issues  The patient also reports complaints of urge associated incontinence and wears a pad for protection.  He also reports worsening of his erectile dysfunction.   He is taking MUSE and failed oral therapies and now uses injections.  s/p TP MR Fusion Prostate Biopsy on 06/29/20. path: 1) GG1, right anterior base, involving 70%, measuring 9 mm 2) GG1 right posterior apex, involving 25%, measuring 3 mm 3) GG2, left apex PZa (5% pattern 4), involving 5%, measuring 1 mm.  The RAB and Left [place] PZa were both visible on MRI  Decipher test on 06/30/2020: 18.2% Risk of HG dz,  2.4% risk of mets w/i 5 years, 3.3% PCSM at 1- years  He had a prostate nodule and a PSA 2.03 on 04/08/2020. His IntelliScore was 47% risk of HG dz 04/20. His prior PSA was 2.07 in 01/2019. Patient has a FMHx of Prostate Cancer.   Patient is also s/p Left partial nephrectomy in 06/19 for 1.8 cm lesion path- Clear Cell RCC that has been followed by Dr. Moody, last visit in 12/2019.  PSA Hx Pedning          1/9/24 0.64   3/14/23    (PSAD 0.02) 0.92 10/6/21 3.16 6/16/20 2.03 4/8/20 2.07 1/8/19  MRI Hx 12/11/2023; 41 cc prostate with no MRI targetable lesions, PIRADS 2; heterogeneous signal throughout the peripheral zone. No ANAT, No LAD, No Bony Lesions. The images have been reviewed and clinical implications discussed with the patient.  MRI 11/4/21 cc prostate with 34 cc, No lesions. No LAD No EPE, No Bony Lesions.  The images have been reviewed and clinical implications discussed with the patient.  MRI [facility] on 5/07/2021.  4 x 3.9 x 4.2 cm; volume 33 cc prostate with PIRADS 3 lesion measuring 5 x mm right, posterior lateral, base, peripheral zone lesion. No LAD No EPE, No Bony Lesions.  The images have been reviewed and clinical implications discussed with the patient.  MRI ([facility]) official read on 05/30/2020. 42 cc, PSAD 0.04, PIRADS 4 lesion measuring 11 x 5 mm in left anterior midgland PZ. No LAD. No EPE. No Bony Lesions. The images have been reviewed and clinical implications discussed with the patient. The NAYE was + in right mid which corresponds with a second lesion i am adding for the biopsy 4 out of 5.   Biopsy Hx 12/09/21-with Dr. Emanuel Neg - on AS - right posterior apex - negative from prior bx  6/25/20-fusion with Dr Emanuel RAB: [place] 3+3, 70%, 9mm RPA: [place] 3+3, 25%, 3mm (not treated) Left apex PZa: [place] 3+4, 5%, 1mm; focus too small to evaluate percent of pattern 4 HGPIN in left apex pza, right mid pzpm, and left mid pzpl  03/133/2023 IPSS 11   QOL 2 NIKKI 5 (off meds)  6/15/2020 IPSS 2 QOL 1  NIKKI 24  The patient denies fevers, chills, nausea and or vomiting and no unexplained weight loss.  All pertinent laboratory, films and physician notes were reviewed.  Questionnaire results were discussed with patient.  I spent 31 minutes of non-face-to-face time reviewing the patient's records chart, uploading processing MR images, transferring MR images from PACS to an independent workstation, reviewing images on an independent workstation, speaking with their physician and planning their prostate biopsy and preparation of an upcoming visit for 01/09/2024 .  The imaging and planning was highly complex and took this entire time.

## 2024-01-10 NOTE — HISTORY OF PRESENT ILLNESS
[FreeTextEntry1] : Dear Dr. Jason Mclaughlin ([facility], Advanced Urology [facility] ([phone])  Chief Complaint: Post Focal Therapy / Prostate Cancer Date of first visit: 06/15/2020 [place]ation:  [place]  JASON MURRAY  is a 73 year old , with recently dx PCa,  he has two malignancies and [facility] [place] [facility] [place]. He is s/p focal ablation of his prostate tumor on 9/30/20.  The patient had two anterior lesions treated.  The patient's base line PSA was 2.07 and now his PSA is 0.9  ng/ml which is 0.02 increase from 0.7 ng/ml (this is within normal limits). There was a right apex posterior area - that was not visualized during prior MRI - this updated MRI demonstrates - two tiny areas that would not rise to level of a clinical sig lesion but we choose to biopsy them.  Bx on 12/1/21 showed no evidence of residual disease. He is doing well and has had no new issues  The patient also reports complaints of urge associated incontinence and wears a pad for protection.  He also reports worsening of his erectile dysfunction.   He is taking MUSE and failed oral therapies and now uses injections.  s/p TP MR Fusion Prostate Biopsy on 06/29/20. path: 1) GG1, right anterior base, involving 70%, measuring 9 mm 2) GG1 right posterior apex, involving 25%, measuring 3 mm 3) GG2, left apex PZa (5% pattern 4), involving 5%, measuring 1 mm.  The RAB and Left [place] PZa were both visible on MRI  Decipher test on 06/30/2020: 18.2% Risk of HG dz,  2.4% risk of mets w/i 5 years, 3.3% PCSM at 1- years  He had a prostate nodule and a PSA 2.03 on 04/08/2020. His IntelliScore was 47% risk of HG dz 04/20. His prior PSA was 2.07 in 01/2019. Patient has a FMHx of Prostate Cancer.   Patient is also s/p Left partial nephrectomy in 06/19 for 1.8 cm lesion path- Clear Cell RCC that has been followed by Dr. Moody, last visit in 12/2019.  PSA Hx Pedning          1/9/24 0.64   3/14/23    (PSAD 0.02) 0.92 10/6/21 3.16 6/16/20 2.03 4/8/20 2.07 1/8/19  MRI Hx 12/11/2023; 41 cc prostate with no MRI targetable lesions, PIRADS 2; heterogeneous signal throughout the peripheral zone. No ANAT, No LAD, No Bony Lesions. The images have been reviewed and clinical implications discussed with the patient.  MRI 11/4/21 cc prostate with 34 cc, No lesions. No LAD No EPE, No Bony Lesions.  The images have been reviewed and clinical implications discussed with the patient.  MRI [facility] on 5/07/2021.  4 x 3.9 x 4.2 cm; volume 33 cc prostate with PIRADS 3 lesion measuring 5 x mm right, posterior lateral, base, peripheral zone lesion. No LAD No EPE, No Bony Lesions.  The images have been reviewed and clinical implications discussed with the patient.  MRI ([facility]) official read on 05/30/2020. 42 cc, PSAD 0.04, PIRADS 4 lesion measuring 11 x 5 mm in left anterior midgland PZ. No LAD. No EPE. No Bony Lesions. The images have been reviewed and clinical implications discussed with the patient. The NAYE was + in right mid which corresponds with a second lesion i am adding for the biopsy 4 out of 5.   Biopsy Hx 12/09/21-with Dr. Emanuel Neg - on AS - right posterior apex - negative from prior bx  6/25/20-fusion with Dr Emanuel RAB: [place] 3+3, 70%, 9mm RPA: [place] 3+3, 25%, 3mm (not treated) Left apex PZa: [place] 3+4, 5%, 1mm; focus too small to evaluate percent of pattern 4 HGPIN in left apex pza, right mid pzpm, and left mid pzpl  03/133/2023 IPSS 11   QOL 2 NIKKI 5 (off meds)  6/15/2020 IPSS 2 QOL 1  NIKKI 24  The patient denies fevers, chills, nausea and or vomiting and no unexplained weight loss.  All pertinent laboratory, films and physician notes were reviewed.  Questionnaire results were discussed with patient.  I spent 31 minutes of non-face-to-face time reviewing the patient's records chart, uploading processing MR images, transferring MR images from PACS to an independent workstation, reviewing images on an independent workstation, speaking with their physician and planning their prostate biopsy and preparation of an upcoming visit for 01/09/2024 .  The imaging and planning was highly complex and took this entire time.

## 2024-01-10 NOTE — ASSESSMENT
[FreeTextEntry1] : 73 year old  male with a history of renal cell carcinoma treated by Dr. Moody (recurrence free) - the patient was found to have a positive NAYE that triggered an evaluation -GG1, right anterior base, involving 70%, measuring 9 mm 2) GG1 right posterior apex, involving 25%, measuring 3 mm 3) GG2, left apex PZa (5% pattern 4), involving 5%, measuring 1 mm. The RAB and Left [place] PZa were both visible on MRI. Exodx high risk (47.9% GG2 or >) prostate cancer. He is now s/p focal cryo with a confirmatory 1 yr bx and negative exodx. The patient has two malignancies and exposure at [facility] 2001.  Prostate Cancer s/p focal therapy 9/30/20 - on AS - PSA draw (1/9/24) yearly PSA (3-years out) - MRI review (12/2023) - agree with read - Outside MRI [facility] - PIRADS 3 - follow up [facility] MRI Nov 2021. - right posterior apex  GG1 on AS - 0,12 (biopsy), now at 3-5 years (negative MRI) - we discussed possible biopsy but recent ExoDx as per patient is now negative post treatment.  offered biopsy he has declined.    BPH/LUTS - Flow (limited volume) - resolved LUTS  ED NIKKI was 24 in 2020 - we will give refill once he gives us the info for his current dose. - on injections for ED failed oral therapy.  Kidney Cancer Follow up s/p Left partial nephrectomy in 06/19 for 1.8 cm lesion path- Clear Cell RCC that has been followed by Dr. Moody - MRI (8/2023) showed no evidence of local recurrence or metastatic disease, f/u with Dr. Moody   Thank you very much for allowing me to assist in the care of this patient. Please do not hesitate to contact me with any additional questions or concerns.      Sincerely,     Elio Emanuel D.O. Professor of Urology and Radiology  of Urology at [facility] Director for Prostate Cancer [address] [place] [address], [address] [place] [address] Phone: [phone]

## 2024-01-10 NOTE — ASSESSMENT
[FreeTextEntry1] : 73 year old  male with a history of renal cell carcinoma treated by Dr. Moody (recurrence free) - the patient was found to have a positive NAYE that triggered an evaluation -GG1, right anterior base, involving 70%, measuring 9 mm 2) GG1 right posterior apex, involving 25%, measuring 3 mm 3) GG2, left apex PZa (5% pattern 4), involving 5%, measuring 1 mm. The RAB and Left [place] PZa were both visible on MRI. Exodx high risk (47.9% GG2 or >) prostate cancer. He is now s/p focal cryo with a confirmatory 1 yr bx and negative exodx. The patient has two malignancies and exposure at [facility] 2001.  Prostate Cancer s/p focal therapy 9/30/20 - on AS - PSA draw (1/9/24) yearly PSA (3-years out) - MRI review (12/2023) - agree with read - Outside MRI [facility] - PIRADS 3 - follow up [facility] MRI Nov 2021. - right posterior apex  GG1 on AS - 0,12 (biopsy), now at 3-5 years (negative MRI) - we discussed possible biopsy but recent ExoDx as per patient is now negative post treatment.  offered biopsy he has declined.    BPH/LUTS - Flow (limited volume) - resolved LUTS  ED NIKKI was 24 in 2020 - we will give refill once he gives us the info for his current dose. - on injections for ED failed oral therapy.  Kidney Cancer Follow up s/p Left partial nephrectomy in 06/19 for 1.8 cm lesion path- Clear Cell RCC that has been followed by Dr. Moody - MRI (8/2023) showed no evidence of local recurrence or metastatic disease, f/u with Dr. Moody   Thank you very much for allowing me to assist in the care of this patient. Please do not hesitate to contact me with any additional questions or concerns.      Sincerely,     Elio Emanuel D.O. Professor of Urology and Radiology  of Urology at [facility] Director for Prostate Cancer [address] [place] [address], [address] [place] [address] Phone: [phone]

## 2024-03-25 NOTE — H&P PST ADULT - HEARING DISTURBANCE
Problem: Chronic Conditions and Co-morbidities  Goal: Patient's chronic conditions and co-morbidity symptoms are monitored and maintained or improved  Outcome: Progressing      loss

## 2024-07-12 ENCOUNTER — OFFICE (OUTPATIENT)
Dept: URBAN - METROPOLITAN AREA CLINIC 63 | Facility: CLINIC | Age: 74
Setting detail: OPHTHALMOLOGY
End: 2024-07-12
Payer: MEDICARE

## 2024-07-12 DIAGNOSIS — H52.4: ICD-10-CM

## 2024-07-12 DIAGNOSIS — H16.223: ICD-10-CM

## 2024-07-12 DIAGNOSIS — H25.13: ICD-10-CM

## 2024-07-12 DIAGNOSIS — H11.153: ICD-10-CM

## 2024-07-12 DIAGNOSIS — H43.812: ICD-10-CM

## 2024-07-12 PROCEDURE — 92015 DETERMINE REFRACTIVE STATE: CPT | Performed by: STUDENT IN AN ORGANIZED HEALTH CARE EDUCATION/TRAINING PROGRAM

## 2024-07-12 PROCEDURE — 92250 FUNDUS PHOTOGRAPHY W/I&R: CPT | Performed by: STUDENT IN AN ORGANIZED HEALTH CARE EDUCATION/TRAINING PROGRAM

## 2024-07-12 PROCEDURE — 92004 COMPRE OPH EXAM NEW PT 1/>: CPT | Performed by: STUDENT IN AN ORGANIZED HEALTH CARE EDUCATION/TRAINING PROGRAM

## 2024-07-12 ASSESSMENT — LID EXAM ASSESSMENTS
OS_MEIBOMITIS: 1+
OD_MEIBOMITIS: 1+

## 2024-07-12 ASSESSMENT — CONFRONTATIONAL VISUAL FIELD TEST (CVF)
OS_FINDINGS: FULL
OD_FINDINGS: FULL

## 2024-11-04 ENCOUNTER — NON-APPOINTMENT (OUTPATIENT)
Age: 74
End: 2024-11-04

## 2024-11-04 ENCOUNTER — APPOINTMENT (OUTPATIENT)
Dept: CARDIOLOGY | Facility: CLINIC | Age: 74
End: 2024-11-04
Payer: MEDICARE

## 2024-11-04 VITALS
BODY MASS INDEX: 34.07 KG/M2 | WEIGHT: 230 LBS | SYSTOLIC BLOOD PRESSURE: 158 MMHG | DIASTOLIC BLOOD PRESSURE: 73 MMHG | HEART RATE: 73 BPM | HEIGHT: 69 IN | OXYGEN SATURATION: 98 %

## 2024-11-04 DIAGNOSIS — I25.10 ATHEROSCLEROTIC HEART DISEASE OF NATIVE CORONARY ARTERY W/OUT ANGINA PECTORIS: ICD-10-CM

## 2024-11-04 PROCEDURE — 99205 OFFICE O/P NEW HI 60 MIN: CPT

## 2024-11-04 PROCEDURE — G2211 COMPLEX E/M VISIT ADD ON: CPT

## 2024-11-04 PROCEDURE — 93000 ELECTROCARDIOGRAM COMPLETE: CPT

## 2024-11-08 ENCOUNTER — APPOINTMENT (OUTPATIENT)
Dept: CARDIOLOGY | Facility: CLINIC | Age: 74
End: 2024-11-08
Payer: MEDICARE

## 2024-11-08 PROCEDURE — 93306 TTE W/DOPPLER COMPLETE: CPT

## 2024-11-11 NOTE — H&P CARDIOLOGY - NSICDXPASTSURGICALHX_GEN_ALL_CORE_FT
PAST SURGICAL HISTORY:  H/O partial nephrectomy 2019    H/O vasectomy     S/P cholecystectomy     S/P knee surgery left - many yrs ago

## 2024-11-11 NOTE — H&P CARDIOLOGY - HISTORY OF PRESENT ILLNESS
74 year old male with PMH CAD, kidney ca s/p resection, prostate ca, HLD, HTN GERD, GABY, laparoscopic cholecystectomy, Knee Arthroscopy.    11/4 cardiology follow-up appt with Dr GRADY Sawyer:   He has been feeling well. He noted mild dyspnea with exertion in cold weather. He has noted that his blood pressure is high. He denies any chest pain, PND, orthopnea, lower extremity edema, near syncope, syncope, strokelike symptoms.   ECG: SR TWI c/w anterior ischemia     11/10/24 TTE:  CONCLUSIONS:  1. Left ventricular systolic function is normal with an ejection fraction of 59 % by Lowery's method of disks. There are no regional wall motion abnormalities seen.  2. There is normal LV mass and concentric remodeling.  3. Normal right ventricular cavity size and normal right ventricular systolic function.  4. Left atrium is normal in size.  5. Lipomatous interatrial septal hypertrophy present.  6. Structurally normal mitral valve with normal leaflet excursion.  7. Trace mitral regurgitation.  8. Trileaflet aortic valve with normal systolic excursion. There is calcification of the aortic valve leaflets.  9. Trace tricuspid regurgitation.  10. Estimated pulmonary artery systolic pressure is 20 mmHg, consistent with normal pulmonary artery pressure.  11. No prior echocardiogram is available for comparison    11/12/24 Pt presents today for elective LHC with Dr Fonseca. Feeling well, no c/o offered. Denies CP, SOB, palpitations, N/V, fever/chills, abd pain, numbness/tingling/weakness, other c/o at this time.

## 2024-11-11 NOTE — H&P CARDIOLOGY - COMMENTS
- PreCath hydration per protocol  - Patient denies any doses of sildenafil within 24 hours of cardiac catheterization

## 2024-11-11 NOTE — H&P CARDIOLOGY - NSICDXPASTMEDICALHX_GEN_ALL_CORE_FT
PAST MEDICAL HISTORY:  Cancer of kidney right kidney    GERD (gastroesophageal reflux disease)     HLD (hyperlipidemia)     Kidney stones     Obstructive sleep apnea, adult uses CPAP machine; sleep studies done many yrs ago    Prostate cancer

## 2024-11-12 ENCOUNTER — TRANSCRIPTION ENCOUNTER (OUTPATIENT)
Age: 74
End: 2024-11-12

## 2024-11-12 ENCOUNTER — INPATIENT (INPATIENT)
Facility: HOSPITAL | Age: 74
LOS: 0 days | Discharge: HOME | DRG: 303 | End: 2024-11-13
Attending: INTERNAL MEDICINE | Admitting: INTERNAL MEDICINE
Payer: COMMERCIAL

## 2024-11-12 VITALS
OXYGEN SATURATION: 97 % | DIASTOLIC BLOOD PRESSURE: 83 MMHG | HEIGHT: 69 IN | TEMPERATURE: 98 F | SYSTOLIC BLOOD PRESSURE: 176 MMHG | HEART RATE: 55 BPM | RESPIRATION RATE: 20 BRPM | WEIGHT: 220.02 LBS

## 2024-11-12 DIAGNOSIS — Z98.52 VASECTOMY STATUS: Chronic | ICD-10-CM

## 2024-11-12 DIAGNOSIS — Z98.890 OTHER SPECIFIED POSTPROCEDURAL STATES: Chronic | ICD-10-CM

## 2024-11-12 DIAGNOSIS — Z90.5 ACQUIRED ABSENCE OF KIDNEY: Chronic | ICD-10-CM

## 2024-11-12 DIAGNOSIS — I25.10 ATHEROSCLEROTIC HEART DISEASE OF NATIVE CORONARY ARTERY WITHOUT ANGINA PECTORIS: ICD-10-CM

## 2024-11-12 PROBLEM — N28.1 CYST OF KIDNEY, ACQUIRED: Chronic | Status: INACTIVE | Noted: 2019-05-30 | Resolved: 2024-11-11

## 2024-11-12 LAB
ANION GAP SERPL CALC-SCNC: 6 MMOL/L — SIGNIFICANT CHANGE UP (ref 5–17)
BUN SERPL-MCNC: 21 MG/DL — SIGNIFICANT CHANGE UP (ref 7–23)
CALCIUM SERPL-MCNC: 8.9 MG/DL — SIGNIFICANT CHANGE UP (ref 8.5–10.1)
CHLORIDE SERPL-SCNC: 107 MMOL/L — SIGNIFICANT CHANGE UP (ref 96–108)
CO2 SERPL-SCNC: 28 MMOL/L — SIGNIFICANT CHANGE UP (ref 22–31)
CREAT SERPL-MCNC: 1.2 MG/DL — SIGNIFICANT CHANGE UP (ref 0.5–1.3)
EGFR: 64 ML/MIN/1.73M2 — SIGNIFICANT CHANGE UP
GLUCOSE SERPL-MCNC: 105 MG/DL — HIGH (ref 70–99)
HCT VFR BLD CALC: 45 % — SIGNIFICANT CHANGE UP (ref 39–50)
HGB BLD-MCNC: 14.8 G/DL — SIGNIFICANT CHANGE UP (ref 13–17)
MCHC RBC-ENTMCNC: 30.6 PG — SIGNIFICANT CHANGE UP (ref 27–34)
MCHC RBC-ENTMCNC: 32.9 G/DL — SIGNIFICANT CHANGE UP (ref 32–36)
MCV RBC AUTO: 93.2 FL — SIGNIFICANT CHANGE UP (ref 80–100)
NRBC # BLD: 0 /100 WBCS — SIGNIFICANT CHANGE UP (ref 0–0)
PLATELET # BLD AUTO: 201 K/UL — SIGNIFICANT CHANGE UP (ref 150–400)
POTASSIUM SERPL-MCNC: 3.7 MMOL/L — SIGNIFICANT CHANGE UP (ref 3.5–5.3)
POTASSIUM SERPL-SCNC: 3.7 MMOL/L — SIGNIFICANT CHANGE UP (ref 3.5–5.3)
RBC # BLD: 4.83 M/UL — SIGNIFICANT CHANGE UP (ref 4.2–5.8)
RBC # FLD: 12.3 % — SIGNIFICANT CHANGE UP (ref 10.3–14.5)
SODIUM SERPL-SCNC: 141 MMOL/L — SIGNIFICANT CHANGE UP (ref 135–145)
WBC # BLD: 6.04 K/UL — SIGNIFICANT CHANGE UP (ref 3.8–10.5)
WBC # FLD AUTO: 6.04 K/UL — SIGNIFICANT CHANGE UP (ref 3.8–10.5)

## 2024-11-12 PROCEDURE — 92928 PRQ TCAT PLMT NTRAC ST 1 LES: CPT | Mod: LD

## 2024-11-12 PROCEDURE — 93571 IV DOP VEL&/PRESS C FLO 1ST: CPT | Mod: 26,52,LD

## 2024-11-12 PROCEDURE — 92978 ENDOLUMINL IVUS OCT C 1ST: CPT | Mod: 26,LD

## 2024-11-12 PROCEDURE — 99223 1ST HOSP IP/OBS HIGH 75: CPT

## 2024-11-12 PROCEDURE — 99152 MOD SED SAME PHYS/QHP 5/>YRS: CPT

## 2024-11-12 PROCEDURE — 93458 L HRT ARTERY/VENTRICLE ANGIO: CPT | Mod: 26,59

## 2024-11-12 RX ORDER — SILDENAFIL CITRATE 100 MG
1 TABLET ORAL
Refills: 0 | DISCHARGE

## 2024-11-12 RX ORDER — CLOPIDOGREL 75 MG/1
1 TABLET ORAL
Qty: 90 | Refills: 3
Start: 2024-11-12 | End: 2025-11-06

## 2024-11-12 RX ORDER — CLOPIDOGREL 75 MG/1
75 TABLET ORAL EVERY 24 HOURS
Refills: 0 | Status: DISCONTINUED | OUTPATIENT
Start: 2024-11-13 | End: 2024-11-13

## 2024-11-12 RX ORDER — PANTOPRAZOLE SODIUM 40 MG/1
1 TABLET, DELAYED RELEASE ORAL
Refills: 0 | DISCHARGE

## 2024-11-12 RX ORDER — SODIUM CHLORIDE 9 MG/ML
1000 INJECTION, SOLUTION INTRAMUSCULAR; INTRAVENOUS; SUBCUTANEOUS
Refills: 0 | Status: DISCONTINUED | OUTPATIENT
Start: 2024-11-12 | End: 2024-11-13

## 2024-11-12 RX ORDER — SILDENAFIL CITRATE 100 MG
0 TABLET ORAL
Refills: 0 | DISCHARGE

## 2024-11-12 RX ORDER — ASPIRIN/MAG CARB/ALUMINUM AMIN 325 MG
81 TABLET ORAL EVERY 24 HOURS
Refills: 0 | Status: DISCONTINUED | OUTPATIENT
Start: 2024-11-13 | End: 2024-11-13

## 2024-11-12 RX ORDER — SODIUM CHLORIDE 9 MG/ML
250 INJECTION, SOLUTION INTRAMUSCULAR; INTRAVENOUS; SUBCUTANEOUS ONCE
Refills: 0 | Status: COMPLETED | OUTPATIENT
Start: 2024-11-12 | End: 2024-11-12

## 2024-11-12 RX ORDER — SODIUM CHLORIDE 9 MG/ML
250 INJECTION, SOLUTION INTRAMUSCULAR; INTRAVENOUS; SUBCUTANEOUS ONCE
Refills: 0 | Status: DISCONTINUED | OUTPATIENT
Start: 2024-11-12 | End: 2024-11-12

## 2024-11-12 RX ORDER — SODIUM CHLORIDE 9 MG/ML
500 INJECTION, SOLUTION INTRAMUSCULAR; INTRAVENOUS; SUBCUTANEOUS
Refills: 0 | Status: DISCONTINUED | OUTPATIENT
Start: 2024-11-12 | End: 2024-11-12

## 2024-11-12 RX ORDER — ASPIRIN/MAG CARB/ALUMINUM AMIN 325 MG
1 TABLET ORAL
Qty: 90 | Refills: 3
Start: 2024-11-12 | End: 2025-11-06

## 2024-11-12 RX ORDER — SODIUM CHLORIDE 9 MG/ML
1000 INJECTION, SOLUTION INTRAMUSCULAR; INTRAVENOUS; SUBCUTANEOUS
Refills: 0 | Status: COMPLETED | OUTPATIENT
Start: 2024-11-12 | End: 2024-11-12

## 2024-11-12 RX ADMIN — SODIUM CHLORIDE 500 MILLILITER(S): 9 INJECTION, SOLUTION INTRAMUSCULAR; INTRAVENOUS; SUBCUTANEOUS at 07:30

## 2024-11-12 RX ADMIN — Medication 40 MILLIGRAM(S): at 21:11

## 2024-11-12 RX ADMIN — SODIUM CHLORIDE 75 MILLILITER(S): 9 INJECTION, SOLUTION INTRAMUSCULAR; INTRAVENOUS; SUBCUTANEOUS at 11:30

## 2024-11-12 RX ADMIN — SODIUM CHLORIDE 75 MILLILITER(S): 9 INJECTION, SOLUTION INTRAMUSCULAR; INTRAVENOUS; SUBCUTANEOUS at 16:56

## 2024-11-12 NOTE — PATIENT PROFILE ADULT - MST SCORE
0 Z Plasty Text: The lesion was extirpated to the level of the fat with a #15 scalpel blade.  Given the location of the defect, shape of the defect and the proximity to free margins a Z-plasty was deemed most appropriate for repair.  Using a sterile surgical marker, the appropriate transposition arms of the Z-plasty were drawn incorporating the defect and placing the expected incisions within the relaxed skin tension lines where possible.    The area thus outlined was incised deep to adipose tissue with a #15 scalpel blade.  The skin margins were undermined to an appropriate distance in all directions utilizing iris scissors.  The opposing transposition arms were then transposed into place in opposite direction and anchored with interrupted buried subcutaneous sutures.

## 2024-11-12 NOTE — CONSULT NOTE ADULT - SUBJECTIVE AND OBJECTIVE BOX
[facility]         Parisa Cruz, Bernice, Mallorie, Jeradl, Silverio, Rj        [phone] (office)    Reason for Consult:    Interval HPI: 74 yr old M with PMHx of CAD, kidney ca s/p resection, prostate ca, HLD, HTN, GERD, GABY presents for cardiac cath. Patient seen and examined at bedside. Pt had 2 stents placed today and will get 1 placed tomorrow. Pt currently denies chest pain, SOB, dizziness, lightheadedness. Pt sees Dr. Sawyer outpt. Pt was started on aspirin 81mg a week ago and denies taking any other medication.     HPI:  74 year old male with PMH CAD, kidney ca s/p resection, prostate ca, HLD, HTN GERD, GABY, laparoscopic cholecystectomy, Knee Arthroscopy.    11/4 cardiology follow-up appt with Dr GRADY Sawyer:   He has been feeling well. He noted mild dyspnea with exertion in cold weather. He has noted that his blood pressure is high. He denies any chest pain, PND, orthopnea, lower extremity edema, near syncope, syncope, strokelike symptoms.   ECG: SR TWI c/w anterior ischemia     11/10/24 TTE:  CONCLUSIONS:  1. Left ventricular systolic function is normal with an ejection fraction of 59 % by Lowery's method of disks. There are no regional wall motion abnormalities seen.  2. There is normal LV mass and concentric remodeling.  3. Normal right ventricular cavity size and normal right ventricular systolic function.  4. Left atrium is normal in size.  5. Lipomatous interatrial septal hypertrophy present.  6. Structurally normal mitral valve with normal leaflet excursion.  7. Trace mitral regurgitation.  8. Trileaflet aortic valve with normal systolic excursion. There is calcification of the aortic valve leaflets.  9. Trace tricuspid regurgitation.  10. Estimated pulmonary artery systolic pressure is 20 mmHg, consistent with normal pulmonary artery pressure.  11. No prior echocardiogram is available for comparison    11/12/24 Pt presents today for elective LHC with Dr Fonseca. Feeling well, no c/o offered. Denies CP, SOB, palpitations, N/V, fever/chills, abd pain, numbness/tingling/weakness, other c/o at this time.  (11 Nov 2024 15:05)      PAST MEDICAL & SURGICAL HISTORY:  Kidney stones      Obstructive sleep apnea, adult  uses CPAP machine; sleep studies done many yrs ago      Prostate cancer      Cancer of kidney  right kidney      HLD (hyperlipidemia)      GERD (gastroesophageal reflux disease)      H/O vasectomy      S/P knee surgery  left - many yrs ago      H/O partial nephrectomy  2019      S/P cholecystectomy          SOCIAL HISTORY: No active tobacco, alcohol or illicit drug use    FAMILY HISTORY:      Home Medications:  krill oil: 1  orally once a day ---last dose 09/23/2020 (04 Feb 2021 06:29)  Omega-3 oral capsule: 1 cap(s) orally once a day (12 Nov 2024 07:32)  Protonix 40 mg oral delayed release tablet: 1 tab(s) orally once a day (12 Nov 2024 07:33)  Saw Palmetto oral capsule: 1 cap(s) orally once a day---last dose 09/23/2020 (04 Feb 2021 06:29)  Sildenafil: PRN.  Take as prescribed. (12 Nov 2024 07:34)      MEDICATIONS  (STANDING):  atorvastatin 40 milliGRAM(s) Oral at bedtime    MEDICATIONS  (PRN):      Allergies    No Known Allergies    Intolerances        REVIEW OF SYSTEMS: Negative except as per HPI.    VITAL SIGNS:   Vital Signs Last 24 Hrs  T(C): 36.7 (12 Nov 2024 09:30), Max: 36.8 (12 Nov 2024 07:22)  T(F): 98 (12 Nov 2024 09:30), Max: 98.2 (12 Nov 2024 07:22)  HR: 51 (12 Nov 2024 09:50) (48 - 55)  BP: 148/80 (12 Nov 2024 09:50) (141/67 - 176/83)  BP(mean): --  RR: 15 (12 Nov 2024 09:50) (15 - 20)  SpO2: 98% (12 Nov 2024 09:50) (96% - 98%)        I&O's Summary      PHYSICAL EXAM:  Constitutional: NAD, well-developed  HEENT NC/AT, moist mucous membranes  Pulmonary: Non-labored, breath sounds are clear bilaterally, no wheezing, rales or rhonchi  Cardiovascular: +S1, S2, RRR, no murmur  Gastrointestinal: Soft, nontender, nondistended  Extremities: No peripheral edema   Neurological: Alert, strength and sensitivity are grossly intact  Skin: No obvious lesions/rashes  Psych: Mood & affect appropriate    LABS: All Labs Reviewed:                        14.8   6.04  )-----------( 201      ( 12 Nov 2024 07:10 )             45.0     12 Nov 2024 07:10    141    |  107    |  21     ----------------------------<  105    3.7     |  28     |  1.20     Ca    8.9        12 Nov 2024 07:10            EKG: sinus bradycardia, nonspecific T wave abnormality, vr: 49bpm, QTc 417.      [facility]         Parisa Cruz, Bernice, Mallorie, Jerald, Silverio, Rj        [phone] (office)    Reason for Consult: cardiac cath    Interval HPI: 74 yr old M with PMHx of CAD, kidney ca s/p resection, prostate ca, HLD, HTN, GERD, GABY presents for cardiac cath. Patient seen and examined at bedside. Pt had 2 stents placed today and will get 1 placed tomorrow. Pt currently denies chest pain, SOB, dizziness, lightheadedness. Pt sees Dr. Sawyer outpt. Pt was started on aspirin 81mg a week ago and denies taking any other medication.     HPI:  74 year old male with PMH CAD, kidney ca s/p resection, prostate ca, HLD, HTN GERD, GABY, laparoscopic cholecystectomy, Knee Arthroscopy.    11/4 cardiology follow-up appt with Dr GRADY Sawyer:   He has been feeling well. He noted mild dyspnea with exertion in cold weather. He has noted that his blood pressure is high. He denies any chest pain, PND, orthopnea, lower extremity edema, near syncope, syncope, strokelike symptoms.   ECG: SR TWI c/w anterior ischemia     11/10/24 TTE:  CONCLUSIONS:  1. Left ventricular systolic function is normal with an ejection fraction of 59 % by Lowery's method of disks. There are no regional wall motion abnormalities seen.  2. There is normal LV mass and concentric remodeling.  3. Normal right ventricular cavity size and normal right ventricular systolic function.  4. Left atrium is normal in size.  5. Lipomatous interatrial septal hypertrophy present.  6. Structurally normal mitral valve with normal leaflet excursion.  7. Trace mitral regurgitation.  8. Trileaflet aortic valve with normal systolic excursion. There is calcification of the aortic valve leaflets.  9. Trace tricuspid regurgitation.  10. Estimated pulmonary artery systolic pressure is 20 mmHg, consistent with normal pulmonary artery pressure.  11. No prior echocardiogram is available for comparison    11/12/24 Pt presents today for elective LHC with Dr Fonseca. Feeling well, no c/o offered. Denies CP, SOB, palpitations, N/V, fever/chills, abd pain, numbness/tingling/weakness, other c/o at this time.  (11 Nov 2024 15:05)      PAST MEDICAL & SURGICAL HISTORY:  Kidney stones      Obstructive sleep apnea, adult  uses CPAP machine; sleep studies done many yrs ago      Prostate cancer      Cancer of kidney  right kidney      HLD (hyperlipidemia)      GERD (gastroesophageal reflux disease)      H/O vasectomy      S/P knee surgery  left - many yrs ago      H/O partial nephrectomy  2019      S/P cholecystectomy          SOCIAL HISTORY: No active tobacco, alcohol or illicit drug use    FAMILY HISTORY:      Home Medications:  krill oil: 1  orally once a day ---last dose 09/23/2020 (04 Feb 2021 06:29)  Omega-3 oral capsule: 1 cap(s) orally once a day (12 Nov 2024 07:32)  Protonix 40 mg oral delayed release tablet: 1 tab(s) orally once a day (12 Nov 2024 07:33)  Saw Palmetto oral capsule: 1 cap(s) orally once a day---last dose 09/23/2020 (04 Feb 2021 06:29)  Sildenafil: PRN.  Take as prescribed. (12 Nov 2024 07:34)      MEDICATIONS  (STANDING):  atorvastatin 40 milliGRAM(s) Oral at bedtime    MEDICATIONS  (PRN):      Allergies    No Known Allergies    Intolerances        REVIEW OF SYSTEMS: Negative except as per HPI.    VITAL SIGNS:   Vital Signs Last 24 Hrs  T(C): 36.7 (12 Nov 2024 09:30), Max: 36.8 (12 Nov 2024 07:22)  T(F): 98 (12 Nov 2024 09:30), Max: 98.2 (12 Nov 2024 07:22)  HR: 51 (12 Nov 2024 09:50) (48 - 55)  BP: 148/80 (12 Nov 2024 09:50) (141/67 - 176/83)  BP(mean): --  RR: 15 (12 Nov 2024 09:50) (15 - 20)  SpO2: 98% (12 Nov 2024 09:50) (96% - 98%)        I&O's Summary      PHYSICAL EXAM:  Constitutional: NAD, well-developed  HEENT NC/AT, moist mucous membranes  Pulmonary: Non-labored, breath sounds are clear bilaterally, no wheezing, rales or rhonchi  Cardiovascular: +S1, S2, RRR, no murmur  Gastrointestinal: Soft, nontender, nondistended  Extremities: No peripheral edema   Neurological: Alert, strength and sensitivity are grossly intact  Skin: No obvious lesions/rashes  Psych: Mood & affect appropriate    LABS: All Labs Reviewed:                        14.8   6.04  )-----------( 201      ( 12 Nov 2024 07:10 )             45.0     12 Nov 2024 07:10    141    |  107    |  21     ----------------------------<  105    3.7     |  28     |  1.20     Ca    8.9        12 Nov 2024 07:10            EKG: sinus bradycardia, nonspecific T wave abnormality, vr: 49bpm, QTc 417.

## 2024-11-12 NOTE — CONSULT NOTE ADULT - ASSESSMENT
74 yr old M with PMHx of CAD, kidney ca s/p resection, prostate ca, HLD, HTN, GERD, GABY presents for cardiac cath.     #Volume  - no signs of fluid overload on exam   - Echo from 11/24: EF 59%, trace MR, trace TR     #Ischemia   - No clear evidence of acute ischemia  - EKG: sinus bradycardia, nonspecific T wave abnormality, vr: 49bpm, QTc 417.  - Hx of CAD: Continue atorvastatin 40mg Qd   - s/p 2 stents today, will be staying overnight and getting 1 additional stent tomorrow   - start aspirin 81mg and clopidogrel 75mg tomorrow   - Continue to monitor for signs or symptoms of ischemia     #Arrhythmia  - EKG: sinus bradycardia, nonspecific T wave abnormality, vr: 49bpm, QTc 417.  - Monitor and replete lytes, keep K>4, Mg>2.    #HTN  - BP currently 176/83  - pt not on home BP meds   - Continue to monitor hemodynamics     - Other cardiovascular workup will depend on clinical course.  - All other workup per primary team.  - Will continue to follow.       74 yr old M with PMHx of CAD, kidney ca s/p resection, prostate ca, HLD, HTN, GERD, GABY presents for cardiac cath.     #Volume  - no signs of fluid overload on exam   - Echo from 11/24: EF 59%, trace MR, trace TR     #Ischemia   - No clear evidence of acute ischemia  - EKG: sinus bradycardia, nonspecific T wave abnormality, vr: 49bpm, QTc 417.  - Hx of CAD: Continue atorvastatin 40mg Qd   - s/p 2 stents today, will be staying overnight and getting 1 additional stent tomorrow   - start aspirin 81mg and clopidogrel 75mg tomorrow   - Continue to monitor for signs or symptoms of ischemia     #Arrhythmia  - EKG: sinus bradycardia, nonspecific T wave abnormality, vr: 49bpm, QTc 417.  - Monitor and replete lytes, keep K>4, Mg>2.    #HTN  - BP currently 176/83  - pt not on home BP meds   - can start losartan 25mg QD is BP remains elevated  - Continue to monitor hemodynamics     - Other cardiovascular workup will depend on clinical course.  - All other workup per primary team.  - Will continue to follow.

## 2024-11-12 NOTE — CONSULT NOTE ADULT - ATTENDING COMMENTS
74 yr old M with PMHx of CAD, kidney ca s/p resection, prostate ca, HLD, HTN, GERD, GABY presents for cardiac cath.         - s/p 2 stents today to LAD, will be staying overnight and getting staged PCI to OM tomorrow   - start aspirin 81mg and clopidogrel 75mg tomorrow   - Continue statin drug  - BP currently 176/83  - pt not on home BP meds   - can start losartan 25mg QD is BP remains elevated

## 2024-11-12 NOTE — DISCHARGE NOTE PROVIDER - NSDCFUADDINST_GEN_ALL_CORE_FT
Wound Care:   the day AFTER your procedure...     Remove the bandage from the site and gently clean with soap and water then pat dry; leave open to air.     You may take a brief shower     Do NOT apply lotions, creams, powders, ointments, or perfumes to your incision site unless prescribed by your physician     Do NOT soak your procedure site for 1 week (no baths, no pools, no tubs, etc...)     Check  your groin and /or wrist daily. A small amount of bruising, and soreness are normal    ACTIVITY: for 24 hours      - DO NOT DRIVE     - DO NOT make any important decisions or sign legal documents      - DO NOT operate heavy machinery      - you may resume sexual activity in 48 hours, unless otherwise instructed by your cardiologist          If your procedure was done through the WRIST: for the NEXT 3 DAYS:          - avoid pushing, pulling, with that affected wrist (such as pushing up from a seated position)          - avoid repeated movement of that hand and wrist (such as typing or hammering)          - DO NOT LIFT anything more than 5 pounds         If your procedure was done through the GROIN: for the NEXT 5 DAYS          - Limit climbing stairs, DO NOT soak in bathtub or pool          - no strenous activities, pushing, pulling, straining          - Do not lift anything more than 10 pounds     MEDICATION:      Please take your medications as explained to you (found on your discharge paperwork)      If you received a stent, you will be taking medication to KEEP YOUR STENT OPEN.            You MUST start taking this medication immediately.           Take this medication as prescribed and uninterrupted.            DO NOT STOP taking them for any reason without consulting with your cardiologist first.      **if you have diabetes and take metformin please do not take this medication for 2 days after the procedure. Restart and take as usual starting day 3.    Follow the heart healthy diet recommended by your doctor.   Drink plenty of water for the next 24 hours unless otherwise instructed.  Do not drink any alcoholic beverages for 24 hours (beer, wine, liquor, etc).    If you smoke: STOP SMOKING. Call the [facility] for Tobacco Control at [phone] for assistance.    CALL your cardiologist/primary care doctor to make a follow-up appointment in 2 WEEKS     **CALL YOUR DOCTOR if you experience     fever, chills, body aches, or severe pain, swelling, redness, heat or yellow discharge at incision site     bleeding or excruciating pain at the procedural site, swelling (golf ball size) at your procedural site     CHEST PAIN     numbness, tingling, temperature change (of your procedural site)     Pain  -you may have pain after your surgery or procedure at the puncture site or in the artery/vein that has been treated.  -take pain medication as directed by your doctor.  call your doctor if your pain is not getting better within 5 days or if it gets worse  -prescription pain medication should be taken with food, and can cause constipation, an over-the-counter softener may be helpful    Nausea  -anesthesia/sedation can upset your stomach  -eat bland foods (Jell-o, crackers, toast) and drink ginger ale if you are nauseated  -drink plenty of fluids such as water or ginger ale (unless instructed otherwise by your doctor)  -if you have nausea or vomiting the day after your procedure, call your doctor    Bleeding  -you may have a small amount of oozing from your surgical or procedural site  -bleeding as the site can be dangerous and should prompt immediate medical attention    Infection  -if you have any of the following signs of infection, call your doctor:       redness, swelling, fever over 101 degrees, thick yellow/white drainage    If you are unable to reach your doctor, you may contact:   Dr Mk Fonseca @ [phone]    **Call 911 immediately if:     - your hand or leg becomes blue, feels cold to touch, or if you have numbness or tingling     - bleeding or swelling from your wrist or groin site cannot be controlled or if area becomes very red or hot to touch     - you have pain, pressure, tightness or burning in your chest, arms, jaw or stomach; shortness of breath; nausea or excessive sweating; lightheadedness; dizziness or a fainting spell; or if you have sudden back or stomach pain     -you have rapid heartbeat or palpitations     - you have bright red blood in large amounts, severe pain at access site (wrist or groin) or significant new swelling at the puncture site    If/because you had anesthesia, for the next 24 hours you should NOT:  -drive a car, operate power tool or machinery  -drink alcohol, beer, or wine  -make important personal or business decisions  If you had any type of sedation, you may experience lightheadedness, dizziness, or sleepiness following your procedure. A responsible adult should stay with you for at least 24 hours following your procedure.

## 2024-11-12 NOTE — DISCHARGE NOTE PROVIDER - NSDCFUSCHEDAPPT_GEN_ALL_CORE_FT
Mk Fonseca  [facility]  CARDIOLOGY [address]  Scheduled Appointment: 11/22/2024

## 2024-11-12 NOTE — DISCHARGE NOTE PROVIDER - PROVIDER TOKENS
FREE:[LAST:[Miniis],FIRST:[Mk],PHONE:[phone]],FAX:[(   )    -],ADDRESS:[[address] [place] [address]],SCHEDULEDAPPT:[11/22/2024],SCHEDULEDAPPTTIME:[09:45 AM]],PROVIDER:[TOKEN:[[id number]],FOLLOWUP:[2 weeks],ESTABLISHEDPATIENT:[T]]

## 2024-11-12 NOTE — DISCHARGE NOTE PROVIDER - NSDCMRMEDTOKEN_GEN_ALL_CORE_FT
aspirin 81 mg oral delayed release tablet: 1 tab(s) orally every 24 hours  krill oil: 1  orally once a day ---last dose 09/23/2020  Omega-3 oral capsule: 1 cap(s) orally once a day  Protonix 40 mg oral delayed release tablet: 1 tab(s) orally once a day  Saw Palmetto oral capsule: 1 cap(s) orally once a day---last dose 09/23/2020  Sildenafil: PRN.  Take as prescribed.   aspirin 81 mg oral delayed release tablet: 1 tab(s) orally once a day  aspirin 81 mg oral delayed release tablet: 1 tab(s) orally every 24 hours  atorvastatin 80 mg oral tablet: 1 tab(s) orally once a day (at bedtime)  clopidogrel 75 mg oral tablet: 1 tab(s) orally once a day  krill oil: 1  orally once a day ---last dose 09/23/2020  Omega-3 oral capsule: 1 cap(s) orally once a day  Protonix 40 mg oral delayed release tablet: 1 tab(s) orally once a day  Saw Palmetto oral capsule: 1 cap(s) orally once a day---last dose 09/23/2020  Sildenafil: PRN.  Take as prescribed.

## 2024-11-12 NOTE — CHART NOTE - NSCHARTNOTEFT_GEN_A_CORE
Post Diagnostic Cardiac Catheterization Chart Note      Prelim cath report:   LHC via RRA  LAD 80% and 95% treated with PCI/BALTAZAR x2.   OM 95%  LVEDP 16 mmHg  full/official report to follow      Patient without complaints. Denies CP, SOB, palpitations, N/V, fever/chills, abd pain, numbness/tingling/weakness, other c/o at this time.    A+O x 3, neurologically intact  RRA access site stable (clean, dry, intact, without bleeding, heat, erythema, or hematoma). Radial band in place.  RUE motor, neuro, circ intact.  Hemodynamically stable, neurologically intact, VS stable, afebrile    Post PCI ECG  11/12/2024 @ 09:39: SB 49 bpm.  NSST change, otherwise no acute changes post PCI.  BENITA 162 ms; QRS duration 88 ms; QTc 417 ms.  A/P: s/p LHC/PCI     -Admit to CPU for overnight observation post PCI  - Stage PCI of residual OM disease tomorrow.  NPO except medications following light breakfast in am.    - Post cath/PCI routine VS, access site, neuro-vascular monitoring and RUE post access precautions ordered  - Post cath access site precautions reviewed with pt who verbalized good understanding  - Post cath hydration as ordered  - Bedrest. May get OOB 30 minutes after radial band removed if wrist and hemodynamics remain stable   - EKG post cath done  - f/u labs and EKG in am  - Continue dual anti platelet therapy with aspirin AND clopidogrel.  DAPT was e-prescribed to patients pharmacy  - Pt education provided/reinforced re: importance of strict adherence to uninterrupted DAPT for minimum of 9-12 months (cardiologist will determine duration)  - Patient educated on benefits of Cardiac Rehab Program; referral provided to patient. Referral faxed and copy placed in medical record. Patient given list of locations with phone numbers of local rehab facilities and advised to contact their insurance company for participating providers. Patient educated on need to bring discharge documents including cardiovascular history, medications, and testing/treatments to first appointment.  - Start statin.  Beta blocker not started due to baseline bradycardia.  Monitor BP.    - Lifestyle modifications discussed to reduce cardiovascular risk factors including weight reduction, smoking cessation (referral provided if applicable), medication compliance, and routine follow up with Cardiologist to track your BMI, cholesterol, and glucose levels.   - Follow-up next week with Dr Fonseca for post PCI check.  Will arrange follow up appointment.    - Follow-up in 2 weeks with outpatient/referring cardiologist  - Continue home medication regimen as appropriate

## 2024-11-12 NOTE — DISCHARGE NOTE PROVIDER - CARE PROVIDER_API CALL
Mk Fonseca  [address] [place] [address]  Phone: [phone]  Fax: (   )    -  Scheduled Appointment: 11/22/2024 09:45 AM    Dino Sawyer  Cardiology  [address] [place] [address]  Phone: [phone]  Fax: [phone]  Established Patient  Follow Up Time: 2 weeks

## 2024-11-12 NOTE — DISCHARGE NOTE PROVIDER - HOSPITAL COURSE
73 y/o male with h/o kidney Ca s/p resection, prostate Ca, GABY on nocturnal CPAP, GERD, HTN, dyslipidemia and known non-obstructive CAD by cardiac catheterization in 2019, who presented to cardiologist c/o mild STEARNS.  He was noted to have new TWI in anterior leads on ECG and was referred for an elective cardiac catheterization on 11/12/2024 which revealed sequential 80% and 95% lesions in the LAD which was treated with successful PCI/BALTAZAR x2.  He was also noted to have OM1 95% which was treated with a staged PCI/BALTAZAR on 11/13/2024. 73 y/o male with h/o kidney Ca s/p resection, prostate Ca, AGBY on nocturnal CPAP, GERD, HTN, dyslipidemia and known non-obstructive CAD by cardiac catheterization in 2019, who presented to cardiologist c/o mild STEARNS.  He was noted to have new TWI in anterior leads on ECG and was referred for an elective cardiac catheterization.    11/12/2024 LHC revealed sequential 80% and 95% lesions in the LAD which was treated with successful PCI/BALTAZAR x2.    He was also noted to have OM1 95%.    11/13/24 staged PCI to OM1 w/ BALTAZAR x 1.  No significant post procedural events. C access site stable. Pt remains hemodynamically stable and is cleared for discharge as d/w Dr Fonseca.

## 2024-11-12 NOTE — DISCHARGE NOTE PROVIDER - CARE PROVIDERS DIRECT ADDRESSES
,[email or web address]_Unknown,[email or web address]

## 2024-11-12 NOTE — PATIENT PROFILE ADULT - FALL HARM RISK - HARM RISK INTERVENTIONS

## 2024-11-13 ENCOUNTER — TRANSCRIPTION ENCOUNTER (OUTPATIENT)
Age: 74
End: 2024-11-13

## 2024-11-13 VITALS
RESPIRATION RATE: 16 BRPM | SYSTOLIC BLOOD PRESSURE: 150 MMHG | DIASTOLIC BLOOD PRESSURE: 75 MMHG | HEART RATE: 62 BPM | OXYGEN SATURATION: 94 %

## 2024-11-13 LAB
ALBUMIN SERPL ELPH-MCNC: 3.6 G/DL — SIGNIFICANT CHANGE UP (ref 3.3–5)
ALP SERPL-CCNC: 76 U/L — SIGNIFICANT CHANGE UP (ref 40–120)
ALT FLD-CCNC: 27 U/L — SIGNIFICANT CHANGE UP (ref 12–78)
ANION GAP SERPL CALC-SCNC: 6 MMOL/L — SIGNIFICANT CHANGE UP (ref 5–17)
AST SERPL-CCNC: 14 U/L — LOW (ref 15–37)
BASOPHILS # BLD AUTO: 0.04 K/UL — SIGNIFICANT CHANGE UP (ref 0–0.2)
BASOPHILS NFR BLD AUTO: 0.5 % — SIGNIFICANT CHANGE UP (ref 0–2)
BILIRUB DIRECT SERPL-MCNC: 0.2 MG/DL — SIGNIFICANT CHANGE UP (ref 0–0.3)
BILIRUB INDIRECT FLD-MCNC: 0.9 MG/DL — SIGNIFICANT CHANGE UP (ref 0.2–1)
BILIRUB SERPL-MCNC: 1.1 MG/DL — SIGNIFICANT CHANGE UP (ref 0.2–1.2)
BUN SERPL-MCNC: 16 MG/DL — SIGNIFICANT CHANGE UP (ref 7–23)
CALCIUM SERPL-MCNC: 9 MG/DL — SIGNIFICANT CHANGE UP (ref 8.5–10.1)
CHLORIDE SERPL-SCNC: 108 MMOL/L — SIGNIFICANT CHANGE UP (ref 96–108)
CHOLEST SERPL-MCNC: 170 MG/DL — SIGNIFICANT CHANGE UP
CO2 SERPL-SCNC: 28 MMOL/L — SIGNIFICANT CHANGE UP (ref 22–31)
CREAT SERPL-MCNC: 1.1 MG/DL — SIGNIFICANT CHANGE UP (ref 0.5–1.3)
EGFR: 71 ML/MIN/1.73M2 — SIGNIFICANT CHANGE UP
EOSINOPHIL # BLD AUTO: 0.12 K/UL — SIGNIFICANT CHANGE UP (ref 0–0.5)
EOSINOPHIL NFR BLD AUTO: 1.5 % — SIGNIFICANT CHANGE UP (ref 0–6)
GLUCOSE SERPL-MCNC: 110 MG/DL — HIGH (ref 70–99)
HCT VFR BLD CALC: 46.6 % — SIGNIFICANT CHANGE UP (ref 39–50)
HDLC SERPL-MCNC: 41 MG/DL — SIGNIFICANT CHANGE UP
HGB BLD-MCNC: 15.8 G/DL — SIGNIFICANT CHANGE UP (ref 13–17)
IMM GRANULOCYTES NFR BLD AUTO: 0.4 % — SIGNIFICANT CHANGE UP (ref 0–0.9)
LIPID PNL WITH DIRECT LDL SERPL: 110 MG/DL — HIGH
LYMPHOCYTES # BLD AUTO: 1.3 K/UL — SIGNIFICANT CHANGE UP (ref 1–3.3)
LYMPHOCYTES # BLD AUTO: 16.3 % — SIGNIFICANT CHANGE UP (ref 13–44)
MCHC RBC-ENTMCNC: 31.2 PG — SIGNIFICANT CHANGE UP (ref 27–34)
MCHC RBC-ENTMCNC: 33.9 G/DL — SIGNIFICANT CHANGE UP (ref 32–36)
MCV RBC AUTO: 91.9 FL — SIGNIFICANT CHANGE UP (ref 80–100)
MONOCYTES # BLD AUTO: 0.53 K/UL — SIGNIFICANT CHANGE UP (ref 0–0.9)
MONOCYTES NFR BLD AUTO: 6.7 % — SIGNIFICANT CHANGE UP (ref 2–14)
NEUTROPHILS # BLD AUTO: 5.94 K/UL — SIGNIFICANT CHANGE UP (ref 1.8–7.4)
NEUTROPHILS NFR BLD AUTO: 74.6 % — SIGNIFICANT CHANGE UP (ref 43–77)
NON HDL CHOLESTEROL: 129 MG/DL — SIGNIFICANT CHANGE UP
NRBC # BLD: 0 /100 WBCS — SIGNIFICANT CHANGE UP (ref 0–0)
PLATELET # BLD AUTO: 234 K/UL — SIGNIFICANT CHANGE UP (ref 150–400)
POTASSIUM SERPL-MCNC: 4 MMOL/L — SIGNIFICANT CHANGE UP (ref 3.5–5.3)
POTASSIUM SERPL-SCNC: 4 MMOL/L — SIGNIFICANT CHANGE UP (ref 3.5–5.3)
PROT SERPL-MCNC: 7.2 G/DL — SIGNIFICANT CHANGE UP (ref 6–8.3)
RBC # BLD: 5.07 M/UL — SIGNIFICANT CHANGE UP (ref 4.2–5.8)
RBC # FLD: 12.5 % — SIGNIFICANT CHANGE UP (ref 10.3–14.5)
SODIUM SERPL-SCNC: 142 MMOL/L — SIGNIFICANT CHANGE UP (ref 135–145)
TRIGL SERPL-MCNC: 104 MG/DL — SIGNIFICANT CHANGE UP
WBC # BLD: 7.96 K/UL — SIGNIFICANT CHANGE UP (ref 3.8–10.5)
WBC # FLD AUTO: 7.96 K/UL — SIGNIFICANT CHANGE UP (ref 3.8–10.5)

## 2024-11-13 PROCEDURE — 92928 PRQ TCAT PLMT NTRAC ST 1 LES: CPT | Mod: LC

## 2024-11-13 PROCEDURE — 93010 ELECTROCARDIOGRAM REPORT: CPT

## 2024-11-13 PROCEDURE — 99232 SBSQ HOSP IP/OBS MODERATE 35: CPT

## 2024-11-13 PROCEDURE — 99152 MOD SED SAME PHYS/QHP 5/>YRS: CPT

## 2024-11-13 PROCEDURE — 92978 ENDOLUMINL IVUS OCT C 1ST: CPT | Mod: 26,LC

## 2024-11-13 PROCEDURE — 93010 ELECTROCARDIOGRAM REPORT: CPT | Mod: 77

## 2024-11-13 RX ORDER — SODIUM CHLORIDE 9 MG/ML
250 INJECTION, SOLUTION INTRAMUSCULAR; INTRAVENOUS; SUBCUTANEOUS ONCE
Refills: 0 | Status: COMPLETED | OUTPATIENT
Start: 2024-11-13 | End: 2024-11-13

## 2024-11-13 RX ORDER — ASPIRIN/MAG CARB/ALUMINUM AMIN 325 MG
1 TABLET ORAL
Qty: 90 | Refills: 3
Start: 2024-11-13 | End: 2025-11-07

## 2024-11-13 RX ORDER — LOSARTAN POTASSIUM 25 MG/1
50 TABLET ORAL DAILY
Refills: 0 | Status: DISCONTINUED | OUTPATIENT
Start: 2024-11-13 | End: 2024-11-13

## 2024-11-13 RX ORDER — METFORMIN HYDROCHLORIDE 500 MG/1
1 TABLET, EXTENDED RELEASE ORAL
Qty: 0 | Refills: 0 | DISCHARGE

## 2024-11-13 RX ORDER — LOSARTAN POTASSIUM 25 MG/1
1 TABLET ORAL
Qty: 90 | Refills: 3
Start: 2024-11-13 | End: 2025-11-07

## 2024-11-13 RX ORDER — SODIUM CHLORIDE 9 MG/ML
500 INJECTION, SOLUTION INTRAMUSCULAR; INTRAVENOUS; SUBCUTANEOUS
Refills: 0 | Status: DISCONTINUED | OUTPATIENT
Start: 2024-11-13 | End: 2024-11-13

## 2024-11-13 RX ORDER — CLOPIDOGREL 75 MG/1
1 TABLET ORAL
Qty: 90 | Refills: 3
Start: 2024-11-13 | End: 2025-11-07

## 2024-11-13 RX ADMIN — LOSARTAN POTASSIUM 50 MILLIGRAM(S): 25 TABLET ORAL at 16:03

## 2024-11-13 RX ADMIN — CLOPIDOGREL 75 MILLIGRAM(S): 75 TABLET ORAL at 05:08

## 2024-11-13 RX ADMIN — SODIUM CHLORIDE 500 MILLILITER(S): 9 INJECTION, SOLUTION INTRAMUSCULAR; INTRAVENOUS; SUBCUTANEOUS at 11:51

## 2024-11-13 RX ADMIN — SODIUM CHLORIDE 75 MILLILITER(S): 9 INJECTION, SOLUTION INTRAMUSCULAR; INTRAVENOUS; SUBCUTANEOUS at 11:51

## 2024-11-13 RX ADMIN — Medication 81 MILLIGRAM(S): at 05:09

## 2024-11-13 NOTE — DISCHARGE NOTE NURSING/CASE MANAGEMENT/SOCIAL WORK - FINANCIAL ASSISTANCE
[facility] provides services at a reduced cost to those who are determined to be eligible through [facility]’s financial assistance program. Information regarding [facility]’s financial assistance program can be found by going to [email or web address] or by calling [phone].

## 2024-11-13 NOTE — PROGRESS NOTE ADULT - ASSESSMENT
74 yr old M with PMHx of CAD, kidney ca s/p resection, prostate ca, HLD, HTN, GERD, GABY presents for cardiac cath.     LHC via RRA LAD 80% and 95% treated with PCI/BALTAZAR x2.      OM 95%, for staged PCI today 11/13   maintain NPO since MN status  NPO after MN  ECG and labs reviewed  procedure discussed with patient; risks and benefits explained, questions answered  consent to be obtained by attending IC  pre cath IVF ordered to prevent KARTIK  Continue ASA and Stain , Plavix   not on bb given baseline bradycardia   ARB deferred to cardiology    pt observed overnight in CPU  post cath access site precautions reviewed with pt who verbalized good understanding  activity as tolerated (except access site precautions)  am labs and EKG noted  continue dual anti platelet therapy with aspirin AND clopidogrel   Pt education provided/reinforced re: importance of strict adherence to uninterrupted DAPT for minimum of 9-12 months (cardiologist will determine duration)  Patient educated on benefits of Cardiac Rehab Program; referral provided to patient. Referral faxed and copy placed in medical record. Patient given list of locations with phone numbers of local rehab facilities and advised to contact their insurance company for participating providers. Patient educated on need to bring discharge documents including cardiovascular history, medications, and testing/treatments to first appointment.  diet as tolerated  Lifestyle modifications discussed to reduce cardiovascular risk factors including weight reduction, smoking cessation (referral provided if applicable), medication compliance, and routine follow up with Cardiologist to track your BMI, cholesterol, and glucose levels.   follow-up next week with Dr Fonseca for post PCI check  follow-up in 2 weeks with outpatient/referring cardiologist

## 2024-11-13 NOTE — PROGRESS NOTE ADULT - SUBJECTIVE AND OBJECTIVE BOX
[facility] -- Mallorie Mccauley Pannella, Patel, Savella Goodger, Cohen  Office # [phone]      Follow Up:    CAD    Subjective/Observations:       REVIEW OF SYSTEMS: All other review of systems is negative unless indicated above    PAST MEDICAL & SURGICAL HISTORY:  Kidney stones      Obstructive sleep apnea, adult  uses CPAP machine; sleep studies done many yrs ago      Prostate cancer      Cancer of kidney  right kidney      HLD (hyperlipidemia)      GERD (gastroesophageal reflux disease)      H/O vasectomy      S/P knee surgery  left - many yrs ago      H/O partial nephrectomy  2019      S/P cholecystectomy          MEDICATIONS  (STANDING):  aspirin enteric coated 81 milliGRAM(s) Oral every 24 hours  atorvastatin 40 milliGRAM(s) Oral at bedtime  clopidogrel Tablet 75 milliGRAM(s) Oral every 24 hours  sodium chloride 0.9%. 1000 milliLiter(s) (75 mL/Hr) IV Continuous <Continuous>    MEDICATIONS  (PRN):      Allergies    No Known Allergies    Intolerances        Vital Signs Last 24 Hrs  T(C): 36.7 (2024 07:59), Max: 37 (2024 17:00)  T(F): 98 (2024 07:59), Max: 98.6 (2024 17:00)  HR: 55 (2024 07:30) (48 - 67)  BP: 163/74 (2024 07:30) (139/89 - 180/78)  BP(mean): 107 (2024 07:30) (89 - 124)  RR: 20 (2024 07:30) (15 - 24)  SpO2: 98% (2024 07:30) (95% - 99%)    Parameters below as of 2024 07:30  Patient On (Oxygen Delivery Method): room air        I&O's Summary    2024 07:01  -  2024 07:00  --------------------------------------------------------  IN: 495 mL / OUT: 1200 mL / NET: -705 mL    2024 07:01  -  2024 08:53  --------------------------------------------------------  IN: 0 mL / OUT: 300 mL / NET: -300 mL      Weight (kg): 102 ( @ 15:30)    PHYSICAL EXAM:  TELE:   Constitutional: NAD, awake and alert, well-developed  HEENT: Moist Mucous Membranes, Anicteric  Pulmonary: Non-labored, breath sounds are clear bilaterally, No wheezing, crackles or rhonchi  Cardiovascular: Regular, S1 and S2 nl, No murmurs, rubs, gallops or clicks  Gastrointestinal: Bowel Sounds present, soft, nontender.   Lymph: No lymphadenopathy. No peripheral edema.  Skin: No visible rashes or ulcers.  Psych:  Mood & affect appropriate    LABS: All Labs Reviewed:                        15.8   7.96  )-----------( 234      ( 2024 06:11 )             46.6                         14.8   6.04  )-----------( 201      ( 2024 07:10 )             45.0     2024 06:11    142    |  108    |  16     ----------------------------<  110    4.0     |  28     |  1.10   2024 07:10    141    |  107    |  21     ----------------------------<  105    3.7     |  28     |  1.20     Ca    9.0        2024 06:11  Ca    8.9        2024 07:10    TPro  7.2    /  Alb  3.6    /  TBili  1.1    /  DBili  0.2    /  AST  14     /  ALT  27     /  AlkPhos  76     2024 06:11             EC Lead ECG:   Ventricular Rate 49 BPM    Atrial Rate 49 BPM    P-R Interval 162 ms    QRS Duration 88 ms    Q-T Interval 462 ms    QTC Calculation(Bazett) 417 ms    P Axis 22 degrees    R Axis -12 degrees    T Axis 33 degrees    Diagnosis Line Sinus bradycardia  Nonspecific T wave abnormality  Confirmed by NIKIA DYE ([id number]) on 2024 10:12:15 AM (24 @ 09:39)        Radiology:         [facility] -- Mallorie Mccauley Pannella, Patel, Savella Goodger, Cohen  Office # [phone]      Follow Up:    CAD    Subjective/Observations:     No events overnight resting comfortably in bed.  No complaints of chest pain, dyspnea, or palpitations reported. No signs of orthopnea or PND.    REVIEW OF SYSTEMS: All other review of systems is negative unless indicated above    PAST MEDICAL & SURGICAL HISTORY:  Kidney stones      Obstructive sleep apnea, adult  uses CPAP machine; sleep studies done many yrs ago      Prostate cancer      Cancer of kidney  right kidney      HLD (hyperlipidemia)      GERD (gastroesophageal reflux disease)      H/O vasectomy      S/P knee surgery  left - many yrs ago      H/O partial nephrectomy  2019      S/P cholecystectomy          MEDICATIONS  (STANDING):  aspirin enteric coated 81 milliGRAM(s) Oral every 24 hours  atorvastatin 40 milliGRAM(s) Oral at bedtime  clopidogrel Tablet 75 milliGRAM(s) Oral every 24 hours  sodium chloride 0.9%. 1000 milliLiter(s) (75 mL/Hr) IV Continuous <Continuous>    MEDICATIONS  (PRN):      Allergies    No Known Allergies    Intolerances        Vital Signs Last 24 Hrs  T(C): 36.7 (2024 07:59), Max: 37 (2024 17:00)  T(F): 98 (2024 07:59), Max: 98.6 (2024 17:00)  HR: 55 (2024 07:30) (48 - 67)  BP: 163/74 (2024 07:30) (139/89 - 180/78)  BP(mean): 107 (2024 07:30) (89 - 124)  RR: 20 (2024 07:30) (15 - 24)  SpO2: 98% (2024 07:30) (95% - 99%)    Parameters below as of 2024 07:30  Patient On (Oxygen Delivery Method): room air        I&O's Summary    2024 07:01  -  2024 07:00  --------------------------------------------------------  IN: 495 mL / OUT: 1200 mL / NET: -705 mL    2024 07:01  -  2024 08:53  --------------------------------------------------------  IN: 0 mL / OUT: 300 mL / NET: -300 mL      Weight (kg): 102 (-12 @ 15:30)    PHYSICAL EXAM:  Constitutional: NAD, awake and alert, well-developed  HEENT: Moist Mucous Membranes, Anicteric  Pulmonary: Non-labored, breath sounds are clear bilaterally, No wheezing, crackles or rhonchi  Cardiovascular: Regular, S1 and S2 nl, No murmurs, rubs, gallops or clicks  Gastrointestinal: Bowel Sounds present, soft, nontender.   Lymph: No lymphadenopathy. No peripheral edema.  Skin: No visible rashes or ulcers.  Psych:  Mood & affect appropriate    LABS: All Labs Reviewed:                        15.8   7.96  )-----------( 234      ( 2024 06:11 )             46.6                         14.8   6.04  )-----------( 201      ( 2024 07:10 )             45.0     2024 06:11    142    |  108    |  16     ----------------------------<  110    4.0     |  28     |  1.10   2024 07:10    141    |  107    |  21     ----------------------------<  105    3.7     |  28     |  1.20     Ca    9.0        2024 06:11  Ca    8.9        2024 07:10    TPro  7.2    /  Alb  3.6    /  TBili  1.1    /  DBili  0.2    /  AST  14     /  ALT  27     /  AlkPhos  76     2024 06:11             EC Lead ECG:   Ventricular Rate 49 BPM    Atrial Rate 49 BPM    P-R Interval 162 ms    QRS Duration 88 ms    Q-T Interval 462 ms    QTC Calculation(Bazett) 417 ms    P Axis 22 degrees    R Axis -12 degrees    T Axis 33 degrees    Diagnosis Line Sinus bradycardia  Nonspecific T wave abnormality  Confirmed by NIKIA DYE ([id number]) on 2024 10:12:15 AM (24 @ 09:39)        Radiology:

## 2024-11-13 NOTE — CARE COORDINATION ASSESSMENT. - OTHER PERTINENT DISCHARGE PLANNING INFORMATION:
Met with patient at bedside to discuss the role of case management with verbalized understanding.  Patient is currently in CPU planned for staged PCI.  Patient resides home with spouse, denies any DME or [facility] services.  WIll monitor for transition needs and remain available.  PCP Dr Jason Mclaughlin

## 2024-11-13 NOTE — PROGRESS NOTE ADULT - NS ATTEND AMEND GEN_ALL_CORE FT
74 yr old M with PMHx of CAD, kidney ca s/p resection, prostate ca, HLD, HTN, GERD, GABY presents for cardiac cath.     - LHC via RRA LAD 80% and 95% treated with PCI/BALTAZAR x2.  OM 95%  - LVEDP 16 mmHg  - for staged PCI of residual OM today 11/13   - Continue ASA and Stain , Plavix   - EKG: sinus bradycardia, nonspecific T wave abnormality    - bp sub optimal   - not on bb given baseline petra  - can start Losartan 25 mg PO daily ir remains elevated    - no signs of fluid overload on exam   - Echo from 11/24: EF 59%, trace MR, trace TR     - Other cardiovascular workup will depend on clinical course.  - All other workup per primary team.  - Will continue to follow.

## 2024-11-13 NOTE — DISCHARGE NOTE NURSING/CASE MANAGEMENT/SOCIAL WORK - PATIENT PORTAL LINK FT
You can access the FollowMyHealth Patient Portal offered by [facility] by registering at the following website: [email or web address]. By joining [facility]’s FollowMyHealth portal, you will also be able to view your health information using other applications (apps) compatible with our system.

## 2024-11-13 NOTE — CARE COORDINATION ASSESSMENT. - NSCAREPROVIDERS_GEN_ALL_CORE_FT
CARE PROVIDERS:  Accepting Physician: Mk Fonseca  Administration: Su Watkins  Administration: Toi Levine  Admitting: Mk Fonseca  Attending: Mk Fonseca  Consultant: Andrae Marques  Consultant: Sabas Orr  Consultant: Cristian De La Rosa  Consultant: Moni Nava  Consultant: Cristi Dillon  Consultant: Lucía Vela  Nurse: Helen Camara  Nurse: Marjorie Lopez  Nurse: Clint Alvarez  Nurse: Clover Mike  Ordered: ADM, User  Outpatient Provider: Dino Sawyer  Override: Jean Carlos Bolton  Override: Lewis Rahman  PCA/Nursing Assistant: Ela Swenson  Primary Team: Sherman Wong  Primary Team: Alix Gomez  Quality Review: Mlodynia, April UR// Supp. Assoc.: Melissa Ramos

## 2024-11-13 NOTE — PROGRESS NOTE ADULT - ASSESSMENT
74 yr old M with PMHx of CAD, kidney ca s/p resection, prostate ca, HLD, HTN, GERD, GABY presents for cardiac cath.     IN Progress    -LHC via RRA LAD 80% and 95% treated with PCI/BALTAZAR x2.  OM 95%  -LVEDP 16 mmHg  -for staged PCI of residual OM today 11/13   -Continue ASA and Stain , Plavix   - EKG: sinus bradycardia, nonspecific T wave abnormality, vr: 49bpm, QTc 417.    -bp sub optimal   -not on bb given bradycardia baseline  -can start Losartan 25 mg PO daily     - no signs of fluid overload on exam   - Echo from 11/24: EF 59%, trace MR, trace TR       - Other cardiovascular workup will depend on clinical course.  - All other workup per primary team.  - Will continue to follow.       74 yr old M with PMHx of CAD, kidney ca s/p resection, prostate ca, HLD, HTN, GERD, GABY presents for cardiac cath.     -LHC via RRA LAD 80% and 95% treated with PCI/BALTAZAR x2.  OM 95%  -LVEDP 16 mmHg  -for staged PCI of residual OM today 11/13   -Continue ASA and Stain , Plavix   - EKG: sinus bradycardia, nonspecific T wave abnormality, vr: 49bpm, QTc 417.    -bp sub optimal   -not on bb given bradycardia baseline  -can start Losartan 25 mg PO daily     - no signs of fluid overload on exam   - Echo from 11/24: EF 59%, trace MR, trace TR     - Other cardiovascular workup will depend on clinical course.  - All other workup per primary team.  - Will continue to follow.

## 2024-11-13 NOTE — PROGRESS NOTE ADULT - SUBJECTIVE AND OBJECTIVE BOX
Department of Cardiology                                                               Division of Interventional Cardiology                                                               [facility] / [place] [id number]                                                                                 [phone]                                                                                                                               Interventional Cardiology Consult / Pre-Procedure Note    24 Prelim cath report:   LHC via RRA  LAD 80% and 95% treated with PCI/BALTAZAR x2.   OM 95%  LVEDP 16 mmHg  full/official report to follow      Subjective/ROS: no c/o offered  Denies CP, SOB, palpitations, N/V, fever/chills, abd pain, numbness/tingling/weakness, other c/o at this time.  ROS negative x 10 systems except as documented as above.      HPI:  74 year old male with PMH CAD, kidney ca s/p resection, prostate ca, HLD, HTN GERD, GABY, laparoscopic cholecystectomy, Knee Arthroscopy.     cardiology follow-up appt with Dr GRADY Sawyer:   He has been feeling well. He noted mild dyspnea with exertion in cold weather. He has noted that his blood pressure is high. He denies any chest pain, PND, orthopnea, lower extremity edema, near syncope, syncope, strokelike symptoms.   ECG: SR TWI c/w anterior ischemia     11/10/24 TTE:  CONCLUSIONS:  1. Left ventricular systolic function is normal with an ejection fraction of 59 % by Lowery's method of disks. There are no regional wall motion abnormalities seen.  2. There is normal LV mass and concentric remodeling.  3. Normal right ventricular cavity size and normal right ventricular systolic function.  4. Left atrium is normal in size.  5. Lipomatous interatrial septal hypertrophy present.  6. Structurally normal mitral valve with normal leaflet excursion.  7. Trace mitral regurgitation.  8. Trileaflet aortic valve with normal systolic excursion. There is calcification of the aortic valve leaflets.  9. Trace tricuspid regurgitation.  10. Estimated pulmonary artery systolic pressure is 20 mmHg, consistent with normal pulmonary artery pressure.  11. No prior echocardiogram is available for comparison    24 Pt presents today for elective LHC with Dr Fonseca. Feeling well, no c/o offered. Denies CP, SOB, palpitations, N/V, fever/chills, abd pain, numbness/tingling/weakness, other c/o at this time.  (2024 15:05)      PAST MEDICAL & SURGICAL HISTORY:  Kidney stones  Obstructive sleep apnea, adult, uses CPAP machine; sleep studies done many yrs ago  Prostate cancer  Cancer of kidney, right kidney  HLD (hyperlipidemia)  GERD (gastroesophageal reflux disease)  H/O vasectomy  S/P knee surgery, left - many yrs ago  H/O partial nephrectomy, 2019  S/P cholecystectomy      MEDICATIONS  (STANDING):  aspirin enteric coated 81 milliGRAM(s) Oral every 24 hours  atorvastatin 40 milliGRAM(s) Oral at bedtime  clopidogrel Tablet 75 milliGRAM(s) Oral every 24 hours    No Known Allergies      T(C): 36.7 (24 @ 10:55), Max: 37 (24 @ 17:00)  HR: 57 (24 @ 10:55) (49 - 67)  Tele: SB/SR 50-60s, no ectopy  BP: 174/99 (24 @ 10:55) (144/78 - 180/78)  RR: 18 (24 @ 10:55) (15 - 24)  SpO2: 98% (24 @ 07:30) (95% - 99%) on room air    Daily Height in cm: 175.2 (2024 10:55)    Daily Weight in k.9 (2024 05:30)    I&O's Summary  2024 07:  -  2024 07:00  --------------------------------------------------------  IN: 495 mL / OUT: 1200 mL / NET: -705 mL    2024 07:01  -  2024 11:20  --------------------------------------------------------  IN: 0 mL / OUT: 300 mL / NET: -300 mL      LABS:	 	                        15.8   7.96  )-----------( 234      ( 2024 06:11 )             46.6         142  |  108  |  16  ----------------------------<  110[H]  4.0   |  28  |  1.10    Ca    9.0      2024 06:11  TPro  7.2  /  Alb  3.6  /  TBili  1.1  /  DBili  0.2  /  AST  14[L]  /  ALT  27  /  AlkPhos  76      Cholesterol: 170 mg/dL (24 @ 06:11)  HDL Cholesterol: 41 mg/dL (24 @ 06:11)  Triglycerides, Serum: 104 mg/dL (24 @ 06:11)  LDL Cholesterol Calculated: 110 mg/dL (24 @ 06:11)  Non HDL Cholesterol: 129 mg/dL (24 @ 06:11)      12 Lead ECG:   Ventricular Rate 49 BPM  Atrial Rate 49 BPM  P-R Interval 162 ms  QRS Duration 88 ms  Q-T Interval 462 ms  QTC Calculation(Bazett) 417 ms  P Axis 22 degrees  R Axis -12 degrees  T Axis 33 degrees  Diagnosis Line Sinus bradycardia  Nonspecific T wave abnormality  Confirmed by NIKIA DYE ([id number]) on 2024 10:12:15 AM (24 @ 09:39)    12 Lead ECG:   Ventricular Rate 52 BPM  Atrial Rate 52 BPM  P-R Interval 158 ms  QRS Duration 94 ms  Q-T Interval 444 ms  QTC Calculation(Bazett) 412 ms  P Axis 16 degrees  R Axis -12 degrees  T Axis 58 degrees  Diagnosis Line Sinus bradycardia  Nonspecific T wave abnormality  Confirmed by NIKIA DYE ([id number]) on 2024 10:12:09 AM (24 @ 07:39)      Physical Exam:  Constitutional: NAD  Neuro: A+O x 3, non-focal, speech clear and intact  HEENT: NC/AT, PERRL, EOMI, anicteric sclerae, oral mucosa pink and moist  Neck: supple, no JVD  CV: regular rate, regular rhythm, +S1S2, no murmurs or rub  Pulm/chest: lung sounds CTA and equal bilaterally, no accessory muscle use noted  Abd: soft, NT, ND  Ext: AUGUSTIN x 4, no C/C/E  RRA access site stable (clean, dry, intact, without bleeding, heat, erythema, or hematoma). RUE motor, neuro, circ intact.  Skin: warm, well perfused  Psych: calm, appropriate affect

## 2024-11-13 NOTE — CARE COORDINATION ASSESSMENT. - NSPASTMEDSURGHISTORY_GEN_ALL_CORE_FT
PAST MEDICAL & SURGICAL HISTORY:  Kidney stones      Obstructive sleep apnea, adult  uses CPAP machine; sleep studies done many yrs ago      H/O vasectomy      S/P knee surgery  left - many yrs ago      Prostate cancer      H/O partial nephrectomy  2019      Cancer of kidney  right kidney      GERD (gastroesophageal reflux disease)      HLD (hyperlipidemia)      S/P cholecystectomy

## 2024-11-16 PROBLEM — K21.9 GASTRO-ESOPHAGEAL REFLUX DISEASE WITHOUT ESOPHAGITIS: Chronic | Status: ACTIVE | Noted: 2024-11-11

## 2024-11-16 PROBLEM — E78.5 HYPERLIPIDEMIA, UNSPECIFIED: Chronic | Status: ACTIVE | Noted: 2024-11-11

## 2024-11-22 ENCOUNTER — APPOINTMENT (OUTPATIENT)
Dept: CARDIOLOGY | Facility: CLINIC | Age: 74
End: 2024-11-22
Payer: MEDICARE

## 2024-11-22 ENCOUNTER — NON-APPOINTMENT (OUTPATIENT)
Age: 74
End: 2024-11-22

## 2024-11-22 VITALS
HEIGHT: 69 IN | HEART RATE: 78 BPM | BODY MASS INDEX: 31.55 KG/M2 | TEMPERATURE: 97.4 F | WEIGHT: 213 LBS | OXYGEN SATURATION: 99 %

## 2024-11-22 DIAGNOSIS — I25.10 ATHEROSCLEROTIC HEART DISEASE OF NATIVE CORONARY ARTERY W/OUT ANGINA PECTORIS: ICD-10-CM

## 2024-11-22 PROCEDURE — 99214 OFFICE O/P EST MOD 30 MIN: CPT

## 2024-11-22 PROCEDURE — 93000 ELECTROCARDIOGRAM COMPLETE: CPT

## 2024-11-26 PROCEDURE — 36415 COLL VENOUS BLD VENIPUNCTURE: CPT

## 2024-11-26 PROCEDURE — C1894: CPT

## 2024-11-26 PROCEDURE — C1725: CPT

## 2024-11-26 PROCEDURE — 80076 HEPATIC FUNCTION PANEL: CPT

## 2024-11-26 PROCEDURE — C9600: CPT | Mod: LD

## 2024-11-26 PROCEDURE — C1874: CPT

## 2024-11-26 PROCEDURE — 80061 LIPID PANEL: CPT

## 2024-11-26 PROCEDURE — 93799 UNLISTED CV SVC/PROCEDURE: CPT

## 2024-11-26 PROCEDURE — C1769: CPT

## 2024-11-26 PROCEDURE — 92978 ENDOLUMINL IVUS OCT C 1ST: CPT | Mod: LC

## 2024-11-26 PROCEDURE — 93005 ELECTROCARDIOGRAM TRACING: CPT

## 2024-11-26 PROCEDURE — 93458 L HRT ARTERY/VENTRICLE ANGIO: CPT | Mod: 59

## 2024-11-26 PROCEDURE — C1887: CPT

## 2024-11-26 PROCEDURE — 85027 COMPLETE CBC AUTOMATED: CPT

## 2024-11-26 PROCEDURE — C1753: CPT

## 2024-11-26 PROCEDURE — 80048 BASIC METABOLIC PNL TOTAL CA: CPT

## 2024-11-26 PROCEDURE — 85025 COMPLETE CBC W/AUTO DIFF WBC: CPT

## 2024-11-27 ENCOUNTER — NON-APPOINTMENT (OUTPATIENT)
Age: 74
End: 2024-11-27

## 2024-11-27 ENCOUNTER — APPOINTMENT (OUTPATIENT)
Dept: CARDIOLOGY | Facility: CLINIC | Age: 74
End: 2024-11-27
Payer: MEDICARE

## 2024-11-27 VITALS
DIASTOLIC BLOOD PRESSURE: 77 MMHG | HEIGHT: 69 IN | WEIGHT: 218 LBS | BODY MASS INDEX: 32.29 KG/M2 | SYSTOLIC BLOOD PRESSURE: 143 MMHG | HEART RATE: 63 BPM | OXYGEN SATURATION: 96 %

## 2024-11-27 DIAGNOSIS — I25.10 ATHEROSCLEROTIC HEART DISEASE OF NATIVE CORONARY ARTERY W/OUT ANGINA PECTORIS: ICD-10-CM

## 2024-11-27 DIAGNOSIS — I10 ESSENTIAL (PRIMARY) HYPERTENSION: ICD-10-CM

## 2024-11-27 DIAGNOSIS — Z09 ENCOUNTER FOR FOLLOW-UP EXAMINATION AFTER COMPLETED TREATMENT FOR CONDITIONS OTHER THAN MALIGNANT NEOPLASM: ICD-10-CM

## 2024-11-27 DIAGNOSIS — E78.5 HYPERLIPIDEMIA, UNSPECIFIED: ICD-10-CM

## 2024-11-27 PROCEDURE — G2211 COMPLEX E/M VISIT ADD ON: CPT

## 2024-11-27 PROCEDURE — 93000 ELECTROCARDIOGRAM COMPLETE: CPT

## 2024-11-27 PROCEDURE — 99215 OFFICE O/P EST HI 40 MIN: CPT

## 2024-11-27 RX ORDER — LOSARTAN POTASSIUM 50 MG/1
50 TABLET, FILM COATED ORAL DAILY
Qty: 90 | Refills: 3 | Status: ACTIVE | COMMUNITY
Start: 2024-11-27 | End: 1900-01-01

## 2024-11-28 PROBLEM — E78.5 HYPERLIPIDEMIA: Status: ACTIVE | Noted: 2024-11-28

## 2024-11-28 PROBLEM — Z09 HOSPITAL DISCHARGE FOLLOW-UP: Status: ACTIVE | Noted: 2024-11-28

## 2024-12-09 RX ORDER — ATORVASTATIN CALCIUM 80 MG/1
80 TABLET, FILM COATED ORAL
Refills: 0 | Status: ACTIVE | COMMUNITY

## 2024-12-09 RX ORDER — ASPIRIN 81 MG
81 TABLET, DELAYED RELEASE (ENTERIC COATED) ORAL
Refills: 0 | Status: ACTIVE | COMMUNITY

## 2024-12-09 RX ORDER — CLOPIDOGREL BISULFATE 75 MG/1
75 TABLET, FILM COATED ORAL
Refills: 0 | Status: ACTIVE | COMMUNITY

## 2024-12-13 ENCOUNTER — APPOINTMENT (OUTPATIENT)
Dept: CARDIOLOGY | Facility: CLINIC | Age: 74
End: 2024-12-13
Payer: MEDICARE

## 2024-12-13 PROCEDURE — 93978 VASCULAR STUDY: CPT

## 2024-12-13 PROCEDURE — 93880 EXTRACRANIAL BILAT STUDY: CPT

## 2025-02-12 ENCOUNTER — NON-APPOINTMENT (OUTPATIENT)
Age: 75
End: 2025-02-12

## 2025-02-12 ENCOUNTER — APPOINTMENT (OUTPATIENT)
Dept: CARDIOLOGY | Facility: CLINIC | Age: 75
End: 2025-02-12
Payer: MEDICARE

## 2025-02-12 VITALS
HEIGHT: 69 IN | BODY MASS INDEX: 30.51 KG/M2 | DIASTOLIC BLOOD PRESSURE: 64 MMHG | HEART RATE: 106 BPM | OXYGEN SATURATION: 87 % | SYSTOLIC BLOOD PRESSURE: 126 MMHG | WEIGHT: 206 LBS

## 2025-02-12 VITALS — OXYGEN SATURATION: 97 %

## 2025-02-12 DIAGNOSIS — I25.10 ATHEROSCLEROTIC HEART DISEASE OF NATIVE CORONARY ARTERY W/OUT ANGINA PECTORIS: ICD-10-CM

## 2025-02-12 DIAGNOSIS — E78.5 HYPERLIPIDEMIA, UNSPECIFIED: ICD-10-CM

## 2025-02-12 PROCEDURE — G2211 COMPLEX E/M VISIT ADD ON: CPT

## 2025-02-12 PROCEDURE — 99214 OFFICE O/P EST MOD 30 MIN: CPT

## 2025-02-12 PROCEDURE — 93000 ELECTROCARDIOGRAM COMPLETE: CPT

## 2025-02-12 RX ORDER — ROSUVASTATIN CALCIUM 40 MG/1
40 TABLET, FILM COATED ORAL DAILY
Qty: 90 | Refills: 3 | Status: ACTIVE | COMMUNITY
Start: 2025-02-12 | End: 1900-01-01

## 2025-02-18 ENCOUNTER — NON-APPOINTMENT (OUTPATIENT)
Age: 75
End: 2025-02-18

## 2025-04-15 ENCOUNTER — NON-APPOINTMENT (OUTPATIENT)
Age: 75
End: 2025-04-15

## 2025-04-23 NOTE — DISCHARGE NOTE PROVIDER - NSDCCPTREATMENT_GEN_ALL_CORE_FT
PRINCIPAL PROCEDURE  Procedure: Percutaneous coronary intervention, primary  Findings and Treatment: PCI/BALTAZAR x2 to LAD on 11/12/2024.  Staged PCI/BALTAZAR to OM on 11/13/2024    
Patient would like warm blankets

## 2025-07-14 ENCOUNTER — NON-APPOINTMENT (OUTPATIENT)
Age: 75
End: 2025-07-14

## 2025-08-14 ENCOUNTER — APPOINTMENT (OUTPATIENT)
Dept: CARDIOLOGY | Facility: CLINIC | Age: 75
End: 2025-08-14
Payer: MEDICARE

## 2025-08-14 VITALS
OXYGEN SATURATION: 99 % | WEIGHT: 224 LBS | BODY MASS INDEX: 33.18 KG/M2 | DIASTOLIC BLOOD PRESSURE: 71 MMHG | SYSTOLIC BLOOD PRESSURE: 130 MMHG | HEART RATE: 54 BPM | HEIGHT: 69 IN

## 2025-08-14 DIAGNOSIS — I25.10 ATHEROSCLEROTIC HEART DISEASE OF NATIVE CORONARY ARTERY W/OUT ANGINA PECTORIS: ICD-10-CM

## 2025-08-14 DIAGNOSIS — I10 ESSENTIAL (PRIMARY) HYPERTENSION: ICD-10-CM

## 2025-08-14 PROCEDURE — 99214 OFFICE O/P EST MOD 30 MIN: CPT

## 2025-08-14 PROCEDURE — G2211 COMPLEX E/M VISIT ADD ON: CPT

## 2025-08-14 PROCEDURE — 93000 ELECTROCARDIOGRAM COMPLETE: CPT

## 2025-08-14 RX ORDER — TIRZEPATIDE 2.5 MG/.5ML
2.5 INJECTION, SOLUTION SUBCUTANEOUS
Qty: 4 | Refills: 0 | Status: ACTIVE | COMMUNITY
Start: 2025-08-14